# Patient Record
Sex: MALE | Race: WHITE | NOT HISPANIC OR LATINO
[De-identification: names, ages, dates, MRNs, and addresses within clinical notes are randomized per-mention and may not be internally consistent; named-entity substitution may affect disease eponyms.]

---

## 2017-03-05 ENCOUNTER — RX ONLY (RX ONLY)
Age: 57
End: 2017-03-05

## 2020-01-16 ENCOUNTER — ESTABLISHED COMPREHENSIVE EXAM (OUTPATIENT)
Dept: URBAN - METROPOLITAN AREA CLINIC 79 | Facility: CLINIC | Age: 60
End: 2020-01-16

## 2020-01-16 DIAGNOSIS — H40.013: ICD-10-CM

## 2020-01-16 DIAGNOSIS — H52.12: ICD-10-CM

## 2020-01-16 PROCEDURE — 92014 COMPRE OPH EXAM EST PT 1/>: CPT

## 2020-01-16 PROCEDURE — 92015 DETERMINE REFRACTIVE STATE: CPT

## 2020-01-16 PROCEDURE — 92083 EXTENDED VISUAL FIELD XM: CPT

## 2020-01-16 PROCEDURE — 92133 CPTRZD OPH DX IMG PST SGM ON: CPT

## 2020-01-16 ASSESSMENT — VISUAL ACUITY
OS_CC: J2
OS_SC: 20/150
OS_CC: 20/30
OD_CC: 20/30
OD_CC: J5
OD_SC: 20/400

## 2020-01-16 ASSESSMENT — TONOMETRY
OD_IOP_MMHG: 20
OS_IOP_MMHG: 19

## 2022-03-02 ENCOUNTER — ESTABLISHED COMPREHENSIVE EXAM (OUTPATIENT)
Dept: URBAN - METROPOLITAN AREA CLINIC 79 | Facility: CLINIC | Age: 62
End: 2022-03-02

## 2022-03-02 DIAGNOSIS — H25.13: ICD-10-CM

## 2022-03-02 DIAGNOSIS — H40.013: ICD-10-CM

## 2022-03-02 PROCEDURE — 92014 COMPRE OPH EXAM EST PT 1/>: CPT

## 2022-03-02 PROCEDURE — 92083 EXTENDED VISUAL FIELD XM: CPT

## 2022-03-02 ASSESSMENT — VISUAL ACUITY
OD_SC: 20/200
OS_CC: 20/30-2
OD_CC: 20/25+2
OS_PH: 20/25
OS_CC: 20/25
OS_SC: 20/70
OD_CC: 20/20

## 2022-03-02 ASSESSMENT — TONOMETRY
OS_IOP_MMHG: 20
OD_IOP_MMHG: 19

## 2022-12-08 ENCOUNTER — IOP CHECK (OUTPATIENT)
Dept: URBAN - METROPOLITAN AREA CLINIC 79 | Facility: CLINIC | Age: 62
End: 2022-12-08

## 2022-12-08 DIAGNOSIS — H25.13: ICD-10-CM

## 2022-12-08 DIAGNOSIS — H40.013: ICD-10-CM

## 2022-12-08 PROCEDURE — 92133 CPTRZD OPH DX IMG PST SGM ON: CPT

## 2022-12-08 PROCEDURE — 99213 OFFICE O/P EST LOW 20 MIN: CPT

## 2022-12-08 ASSESSMENT — VISUAL ACUITY
OS_CC: 20/20
OD_CC: 20/25-1

## 2022-12-08 ASSESSMENT — TONOMETRY
OD_IOP_MMHG: 18
OS_IOP_MMHG: 20

## 2023-04-13 ENCOUNTER — ESTABLISHED COMPREHENSIVE EXAM (OUTPATIENT)
Dept: URBAN - METROPOLITAN AREA CLINIC 79 | Facility: CLINIC | Age: 63
End: 2023-04-13

## 2023-04-13 DIAGNOSIS — H40.013: ICD-10-CM

## 2023-04-13 DIAGNOSIS — H52.12: ICD-10-CM

## 2023-04-13 DIAGNOSIS — H25.13: ICD-10-CM

## 2023-04-13 PROCEDURE — 92015 DETERMINE REFRACTIVE STATE: CPT

## 2023-04-13 PROCEDURE — 92250 FUNDUS PHOTOGRAPHY W/I&R: CPT

## 2023-04-13 PROCEDURE — 99213 OFFICE O/P EST LOW 20 MIN: CPT

## 2023-04-13 PROCEDURE — 92083 EXTENDED VISUAL FIELD XM: CPT

## 2023-04-13 ASSESSMENT — VISUAL ACUITY
OD_SC: 20/400
OS_CC: 20/25+3
OD_CC: 20/25+3
OS_CC: 20/20-1
OD_CC: 20/30-1
OS_SC: 20/70

## 2023-04-13 ASSESSMENT — TONOMETRY
OS_IOP_MMHG: 18
OD_IOP_MMHG: 16

## 2024-01-29 ENCOUNTER — TRANSCRIBE ORDERS (OUTPATIENT)
Dept: REGISTRATION | Facility: HOSPITAL | Age: 64
End: 2024-01-29

## 2024-01-29 ENCOUNTER — HOSPITAL ENCOUNTER (OUTPATIENT)
Dept: RADIOLOGY | Facility: HOSPITAL | Age: 64
Discharge: HOME | End: 2024-01-29
Attending: PHYSICIAN ASSISTANT
Payer: COMMERCIAL

## 2024-01-29 DIAGNOSIS — N50.89 OTHER SPECIFIED DISORDERS OF THE MALE GENITAL ORGANS: ICD-10-CM

## 2024-01-29 DIAGNOSIS — N50.89 OTHER SPECIFIED DISORDERS OF THE MALE GENITAL ORGANS: Primary | ICD-10-CM

## 2024-01-29 PROCEDURE — 76870 US EXAM SCROTUM: CPT

## 2024-03-05 ENCOUNTER — APPOINTMENT (OUTPATIENT)
Age: 64
Setting detail: DERMATOLOGY
End: 2024-03-05

## 2024-03-05 DIAGNOSIS — L72.8 OTHER FOLLICULAR CYSTS OF THE SKIN AND SUBCUTANEOUS TISSUE: ICD-10-CM

## 2024-03-05 PROCEDURE — 99212 OFFICE O/P EST SF 10 MIN: CPT

## 2024-03-05 PROCEDURE — OTHER COUNSELING: OTHER

## 2024-03-05 PROCEDURE — OTHER PRESCRIPTION: OTHER

## 2024-03-05 PROCEDURE — OTHER PRESCRIPTION MEDICATION MANAGEMENT: OTHER

## 2024-03-05 RX ORDER — MUPIROCIN 20 MG/G
OINTMENT TOPICAL
Qty: 22 | Refills: 0 | Status: ERX | COMMUNITY
Start: 2024-03-05

## 2024-03-05 ASSESSMENT — LOCATION ZONE DERM: LOCATION ZONE: FACE

## 2024-03-05 ASSESSMENT — LOCATION SIMPLE DESCRIPTION DERM: LOCATION SIMPLE: LEFT CHEEK

## 2024-03-05 ASSESSMENT — LOCATION DETAILED DESCRIPTION DERM: LOCATION DETAILED: LEFT INFERIOR LATERAL MALAR CHEEK

## 2024-03-05 NOTE — PROCEDURE: PRESCRIPTION MEDICATION MANAGEMENT
Continue Regimen: Continue doxycycline 100 mg bid x 10 days.
Detail Level: Zone
Render In Strict Bullet Format?: No

## 2024-03-25 ENCOUNTER — RX ONLY (RX ONLY)
Age: 64
End: 2024-03-25

## 2024-03-25 RX ORDER — KETOCONAZOLE 20 MG/G
CREAM TOPICAL
Qty: 60 | Refills: 1 | Status: ERX | COMMUNITY
Start: 2024-03-25

## 2024-04-10 ENCOUNTER — APPOINTMENT (OUTPATIENT)
Age: 64
Setting detail: DERMATOLOGY
End: 2024-04-11

## 2024-04-10 DIAGNOSIS — M72.2 PLANTAR FASCIAL FIBROMATOSIS: ICD-10-CM

## 2024-04-10 DIAGNOSIS — D22 MELANOCYTIC NEVI: ICD-10-CM

## 2024-04-10 DIAGNOSIS — L82.1 OTHER SEBORRHEIC KERATOSIS: ICD-10-CM

## 2024-04-10 DIAGNOSIS — L81.4 OTHER MELANIN HYPERPIGMENTATION: ICD-10-CM

## 2024-04-10 DIAGNOSIS — L663 OTHER SPECIFIED DISEASES OF HAIR AND HAIR FOLLICLES: ICD-10-CM

## 2024-04-10 DIAGNOSIS — D18.0 HEMANGIOMA: ICD-10-CM

## 2024-04-10 DIAGNOSIS — L57.8 OTHER SKIN CHANGES DUE TO CHRONIC EXPOSURE TO NONIONIZING RADIATION: ICD-10-CM

## 2024-04-10 DIAGNOSIS — L738 OTHER SPECIFIED DISEASES OF HAIR AND HAIR FOLLICLES: ICD-10-CM

## 2024-04-10 PROBLEM — L02.426 FURUNCLE OF LEFT LOWER LIMB: Status: ACTIVE | Noted: 2024-04-10

## 2024-04-10 PROBLEM — L02.425 FURUNCLE OF RIGHT LOWER LIMB: Status: ACTIVE | Noted: 2024-04-10

## 2024-04-10 PROBLEM — D22.61 MELANOCYTIC NEVI OF RIGHT UPPER LIMB, INCLUDING SHOULDER: Status: ACTIVE | Noted: 2024-04-10

## 2024-04-10 PROBLEM — D18.01 HEMANGIOMA OF SKIN AND SUBCUTANEOUS TISSUE: Status: ACTIVE | Noted: 2024-04-10

## 2024-04-10 PROCEDURE — OTHER SUNSCREEN RECOMMENDATIONS: OTHER

## 2024-04-10 PROCEDURE — OTHER COUNSELING: OTHER

## 2024-04-10 PROCEDURE — 99213 OFFICE O/P EST LOW 20 MIN: CPT

## 2024-04-10 ASSESSMENT — LOCATION DETAILED DESCRIPTION DERM
LOCATION DETAILED: LEFT MEDIAL PLANTAR MIDFOOT
LOCATION DETAILED: LEFT INFERIOR MEDIAL MIDBACK
LOCATION DETAILED: RIGHT MEDIAL UPPER BACK
LOCATION DETAILED: RIGHT PROXIMAL POSTERIOR UPPER ARM
LOCATION DETAILED: RIGHT DISTAL CALF
LOCATION DETAILED: RIGHT SUPERIOR UPPER BACK
LOCATION DETAILED: EPIGASTRIC SKIN
LOCATION DETAILED: LEFT DISTAL CALF

## 2024-04-10 ASSESSMENT — LOCATION SIMPLE DESCRIPTION DERM
LOCATION SIMPLE: LEFT CALF
LOCATION SIMPLE: ABDOMEN
LOCATION SIMPLE: LEFT LOWER BACK
LOCATION SIMPLE: LEFT PLANTAR SURFACE
LOCATION SIMPLE: RIGHT UPPER BACK
LOCATION SIMPLE: RIGHT UPPER ARM
LOCATION SIMPLE: RIGHT CALF

## 2024-04-10 ASSESSMENT — LOCATION ZONE DERM
LOCATION ZONE: LEG
LOCATION ZONE: TRUNK
LOCATION ZONE: FEET
LOCATION ZONE: ARM

## 2024-04-10 NOTE — PROCEDURE: SUNSCREEN RECOMMENDATIONS

## 2024-05-22 ENCOUNTER — ESTABLISHED COMPREHENSIVE EXAM (OUTPATIENT)
Dept: URBAN - METROPOLITAN AREA CLINIC 79 | Facility: CLINIC | Age: 64
End: 2024-05-22

## 2024-05-22 DIAGNOSIS — H43.813: ICD-10-CM

## 2024-05-22 DIAGNOSIS — H25.13: ICD-10-CM

## 2024-05-22 DIAGNOSIS — H04.123: ICD-10-CM

## 2024-05-22 DIAGNOSIS — H52.11: ICD-10-CM

## 2024-05-22 DIAGNOSIS — H40.013: ICD-10-CM

## 2024-05-22 PROCEDURE — 92083 EXTENDED VISUAL FIELD XM: CPT

## 2024-05-22 PROCEDURE — 92015 DETERMINE REFRACTIVE STATE: CPT

## 2024-05-22 PROCEDURE — 92133 CPTRZD OPH DX IMG PST SGM ON: CPT

## 2024-05-22 PROCEDURE — 92014 COMPRE OPH EXAM EST PT 1/>: CPT

## 2024-05-22 PROCEDURE — 83861 MICROFLUID ANALY TEARS: CPT

## 2024-05-22 ASSESSMENT — VISUAL ACUITY
OD_CC: J3
OS_CC: J2
OD_CC: 20/30+1
OS_CC: 20/25-3

## 2024-05-22 ASSESSMENT — TONOMETRY
OD_IOP_MMHG: 20
OS_IOP_MMHG: 21

## 2024-10-28 ENCOUNTER — APPOINTMENT (EMERGENCY)
Dept: RADIOLOGY | Facility: HOSPITAL | Age: 64
DRG: 280 | End: 2024-10-28
Payer: COMMERCIAL

## 2024-10-28 ENCOUNTER — HOSPITAL ENCOUNTER (INPATIENT)
Facility: HOSPITAL | Age: 64
LOS: 1 days | Discharge: HOME | DRG: 280 | End: 2024-10-30
Attending: EMERGENCY MEDICINE | Admitting: INTERNAL MEDICINE
Payer: COMMERCIAL

## 2024-10-28 DIAGNOSIS — I82.403 DEEP VEIN THROMBOSIS (DVT) OF BOTH LOWER EXTREMITIES, UNSPECIFIED CHRONICITY, UNSPECIFIED VEIN (CMS/HCC): ICD-10-CM

## 2024-10-28 DIAGNOSIS — I25.10 CAD (CORONARY ARTERY DISEASE): ICD-10-CM

## 2024-10-28 DIAGNOSIS — I21.4 NSTEMI (NON-ST ELEVATED MYOCARDIAL INFARCTION) (CMS/HCC): Primary | ICD-10-CM

## 2024-10-28 DIAGNOSIS — R79.89 ELEVATED TROPONIN: ICD-10-CM

## 2024-10-28 PROBLEM — F41.9 ANXIETY: Status: ACTIVE | Noted: 2024-10-28

## 2024-10-28 LAB
ALBUMIN SERPL-MCNC: 4.4 G/DL (ref 3.5–5.7)
ALP SERPL-CCNC: 53 IU/L (ref 34–125)
ALT SERPL-CCNC: 30 IU/L (ref 7–52)
ANION GAP SERPL CALC-SCNC: 9 MEQ/L (ref 3–15)
AST SERPL-CCNC: 23 IU/L (ref 13–39)
BASOPHILS # BLD: 0.04 K/UL (ref 0.01–0.1)
BASOPHILS NFR BLD: 0.5 %
BILIRUB SERPL-MCNC: 0.5 MG/DL (ref 0.3–1.2)
BUN SERPL-MCNC: 19 MG/DL (ref 7–25)
CALCIUM SERPL-MCNC: 9.7 MG/DL (ref 8.6–10.3)
CHLORIDE SERPL-SCNC: 103 MEQ/L (ref 98–107)
CO2 SERPL-SCNC: 24 MEQ/L (ref 21–31)
CREAT SERPL-MCNC: 1 MG/DL (ref 0.7–1.3)
DIFFERENTIAL METHOD BLD: NORMAL
EGFRCR SERPLBLD CKD-EPI 2021: >60 ML/MIN/1.73M*2
EOSINOPHIL # BLD: 0.07 K/UL (ref 0.04–0.54)
EOSINOPHIL NFR BLD: 0.9 %
ERYTHROCYTE [DISTWIDTH] IN BLOOD BY AUTOMATED COUNT: 12.8 % (ref 11.6–14.4)
GLUCOSE SERPL-MCNC: 111 MG/DL (ref 70–99)
HCT VFR BLD AUTO: 46.5 % (ref 40.1–51)
HGB BLD-MCNC: 15.9 G/DL (ref 13.7–17.5)
IMM GRANULOCYTES # BLD AUTO: 0.01 K/UL (ref 0–0.08)
IMM GRANULOCYTES NFR BLD AUTO: 0.1 %
LYMPHOCYTES # BLD: 2.23 K/UL (ref 1.2–3.5)
LYMPHOCYTES NFR BLD: 29.7 %
MCH RBC QN AUTO: 32.6 PG (ref 28–33.2)
MCHC RBC AUTO-ENTMCNC: 34.2 G/DL (ref 32.2–36.5)
MCV RBC AUTO: 95.5 FL (ref 83–98)
MONOCYTES # BLD: 0.91 K/UL (ref 0.3–1)
MONOCYTES NFR BLD: 12.1 %
NEUTROPHILS # BLD: 4.24 K/UL (ref 1.7–7)
NEUTS SEG NFR BLD: 56.7 %
NRBC BLD-RTO: 0 %
PLATELET # BLD AUTO: 157 K/UL (ref 150–350)
PMV BLD AUTO: 10.8 FL (ref 9.4–12.4)
POTASSIUM SERPL-SCNC: 3.8 MEQ/L (ref 3.5–5.1)
PROT SERPL-MCNC: 7.6 G/DL (ref 6–8.2)
RBC # BLD AUTO: 4.87 M/UL (ref 4.5–5.8)
SODIUM SERPL-SCNC: 136 MEQ/L (ref 136–145)
TROPONIN I SERPL HS-MCNC: 401.4 PG/ML
TROPONIN I SERPL HS-MCNC: 498.7 PG/ML
WBC # BLD AUTO: 7.5 K/UL (ref 3.8–10.5)

## 2024-10-28 PROCEDURE — 71046 X-RAY EXAM CHEST 2 VIEWS: CPT

## 2024-10-28 PROCEDURE — 84484 ASSAY OF TROPONIN QUANT: CPT | Performed by: EMERGENCY MEDICINE

## 2024-10-28 PROCEDURE — 63700000 HC SELF-ADMINISTRABLE DRUG: Performed by: STUDENT IN AN ORGANIZED HEALTH CARE EDUCATION/TRAINING PROGRAM

## 2024-10-28 PROCEDURE — 85025 COMPLETE CBC W/AUTO DIFF WBC: CPT | Performed by: EMERGENCY MEDICINE

## 2024-10-28 PROCEDURE — 93005 ELECTROCARDIOGRAM TRACING: CPT

## 2024-10-28 PROCEDURE — 80053 COMPREHEN METABOLIC PANEL: CPT

## 2024-10-28 PROCEDURE — 63700000 HC SELF-ADMINISTRABLE DRUG

## 2024-10-28 PROCEDURE — 99222 1ST HOSP IP/OBS MODERATE 55: CPT | Performed by: STUDENT IN AN ORGANIZED HEALTH CARE EDUCATION/TRAINING PROGRAM

## 2024-10-28 PROCEDURE — 36415 COLL VENOUS BLD VENIPUNCTURE: CPT

## 2024-10-28 PROCEDURE — 85025 COMPLETE CBC W/AUTO DIFF WBC: CPT

## 2024-10-28 PROCEDURE — G0378 HOSPITAL OBSERVATION PER HR: HCPCS

## 2024-10-28 PROCEDURE — 93005 ELECTROCARDIOGRAM TRACING: CPT | Performed by: EMERGENCY MEDICINE

## 2024-10-28 PROCEDURE — 84484 ASSAY OF TROPONIN QUANT: CPT | Mod: 91 | Performed by: EMERGENCY MEDICINE

## 2024-10-28 PROCEDURE — 80053 COMPREHEN METABOLIC PANEL: CPT | Performed by: EMERGENCY MEDICINE

## 2024-10-28 PROCEDURE — 99285 EMERGENCY DEPT VISIT HI MDM: CPT | Mod: 25

## 2024-10-28 RX ORDER — DULOXETIN HYDROCHLORIDE 20 MG/1
20 CAPSULE, DELAYED RELEASE ORAL DAILY
Status: DISCONTINUED | OUTPATIENT
Start: 2024-10-29 | End: 2024-10-28

## 2024-10-28 RX ORDER — DULOXETIN HYDROCHLORIDE 20 MG/1
40 CAPSULE, DELAYED RELEASE ORAL DAILY
Status: DISCONTINUED | OUTPATIENT
Start: 2024-10-29 | End: 2024-10-30 | Stop reason: HOSPADM

## 2024-10-28 RX ORDER — CLONAZEPAM 0.5 MG/1
0.5 TABLET ORAL ONCE
Status: COMPLETED | OUTPATIENT
Start: 2024-10-29 | End: 2024-10-28

## 2024-10-28 RX ORDER — POTASSIUM CHLORIDE 14.9 MG/ML
20 INJECTION INTRAVENOUS AS NEEDED
Status: DISCONTINUED | OUTPATIENT
Start: 2024-10-28 | End: 2024-10-30 | Stop reason: HOSPADM

## 2024-10-28 RX ORDER — AMLODIPINE BESYLATE 2.5 MG/1
2.5 TABLET ORAL DAILY
COMMUNITY
Start: 2023-12-06

## 2024-10-28 RX ORDER — NITROGLYCERIN 0.4 MG/1
0.4 TABLET SUBLINGUAL EVERY 5 MIN PRN
Status: DISCONTINUED | OUTPATIENT
Start: 2024-10-28 | End: 2024-10-30 | Stop reason: HOSPADM

## 2024-10-28 RX ORDER — CELECOXIB 100 MG/1
100-200 CAPSULE ORAL DAILY PRN
COMMUNITY

## 2024-10-28 RX ORDER — ATORVASTATIN CALCIUM 20 MG/1
20 TABLET, FILM COATED ORAL DAILY
COMMUNITY
Start: 2024-10-25

## 2024-10-28 RX ORDER — DULOXETIN HYDROCHLORIDE 20 MG/1
40 CAPSULE, DELAYED RELEASE ORAL DAILY
COMMUNITY
Start: 2024-10-20

## 2024-10-28 RX ORDER — NAPROXEN SODIUM 220 MG/1
324 TABLET, FILM COATED ORAL ONCE
Status: COMPLETED | OUTPATIENT
Start: 2024-10-28 | End: 2024-10-28

## 2024-10-28 RX ORDER — NAPROXEN SODIUM 220 MG/1
81 TABLET, FILM COATED ORAL DAILY
Status: DISCONTINUED | OUTPATIENT
Start: 2024-10-29 | End: 2024-10-30 | Stop reason: HOSPADM

## 2024-10-28 RX ORDER — ATORVASTATIN CALCIUM 20 MG/1
20 TABLET, FILM COATED ORAL DAILY
Status: DISCONTINUED | OUTPATIENT
Start: 2024-10-29 | End: 2024-10-30 | Stop reason: HOSPADM

## 2024-10-28 RX ORDER — DEXTROSE 40 %
15-30 GEL (GRAM) ORAL AS NEEDED
Status: DISCONTINUED | OUTPATIENT
Start: 2024-10-28 | End: 2024-10-30 | Stop reason: HOSPADM

## 2024-10-28 RX ORDER — ACETAMINOPHEN 325 MG/1
650 TABLET ORAL EVERY 4 HOURS PRN
Status: DISCONTINUED | OUTPATIENT
Start: 2024-10-28 | End: 2024-10-30 | Stop reason: HOSPADM

## 2024-10-28 RX ORDER — KETOCONAZOLE 20 MG/G
1 CREAM TOPICAL 2 TIMES DAILY PRN
COMMUNITY

## 2024-10-28 RX ORDER — LISINOPRIL 40 MG/1
40 TABLET ORAL DAILY
COMMUNITY
Start: 2023-12-06

## 2024-10-28 RX ORDER — DEXTROSE 50 % IN WATER (D50W) INTRAVENOUS SYRINGE
25 AS NEEDED
Status: DISCONTINUED | OUTPATIENT
Start: 2024-10-28 | End: 2024-10-30 | Stop reason: HOSPADM

## 2024-10-28 RX ORDER — IBUPROFEN 200 MG
16-32 TABLET ORAL AS NEEDED
Status: DISCONTINUED | OUTPATIENT
Start: 2024-10-28 | End: 2024-10-30 | Stop reason: HOSPADM

## 2024-10-28 RX ORDER — CLONAZEPAM 0.5 MG/1
0.5 TABLET ORAL ONCE
Status: COMPLETED | OUTPATIENT
Start: 2024-10-28 | End: 2024-10-28

## 2024-10-28 RX ORDER — LISINOPRIL 20 MG/1
40 TABLET ORAL DAILY
Status: DISCONTINUED | OUTPATIENT
Start: 2024-10-29 | End: 2024-10-30 | Stop reason: HOSPADM

## 2024-10-28 RX ORDER — AMLODIPINE BESYLATE 2.5 MG/1
2.5 TABLET ORAL DAILY
Status: DISCONTINUED | OUTPATIENT
Start: 2024-10-29 | End: 2024-10-30 | Stop reason: HOSPADM

## 2024-10-28 RX ADMIN — CLONAZEPAM 0.5 MG: 0.5 TABLET ORAL at 23:54

## 2024-10-28 RX ADMIN — CLONAZEPAM 0.5 MG: 0.5 TABLET ORAL at 22:38

## 2024-10-28 RX ADMIN — ASPIRIN 81 MG 324 MG: 81 TABLET ORAL at 21:03

## 2024-10-28 ASSESSMENT — COGNITIVE AND FUNCTIONAL STATUS - GENERAL
WALKING IN HOSPITAL ROOM: 4 - NONE
CLIMB 3 TO 5 STEPS WITH RAILING: 4 - NONE
STANDING UP FROM CHAIR USING ARMS: 4 - NONE
MOVING TO AND FROM BED TO CHAIR: 4 - NONE

## 2024-10-28 ASSESSMENT — ENCOUNTER SYMPTOMS: SHORTNESS OF BREATH: 1

## 2024-10-28 NOTE — Clinical Note
The bilateral groins and left radial was clipped, marked and prepped with ChloraPrep. The patient was draped in a sterile fashion after allowing for the recommended dry time.

## 2024-10-28 NOTE — Clinical Note
Patient placed on procedure table in supine position with left arm extended. Positioning devices: arm board under arms and safety strap applied.

## 2024-10-29 ENCOUNTER — APPOINTMENT (OUTPATIENT)
Dept: RADIOLOGY | Facility: HOSPITAL | Age: 64
Setting detail: OBSERVATION
DRG: 280 | End: 2024-10-29
Attending: INTERNAL MEDICINE
Payer: COMMERCIAL

## 2024-10-29 ENCOUNTER — APPOINTMENT (OUTPATIENT)
Dept: CARDIOLOGY | Facility: HOSPITAL | Age: 64
Setting detail: OBSERVATION
DRG: 280 | End: 2024-10-29
Attending: NURSE PRACTITIONER
Payer: COMMERCIAL

## 2024-10-29 PROBLEM — I26.99 PULMONARY EMBOLISM (CMS/HCC): Status: ACTIVE | Noted: 2024-10-29

## 2024-10-29 PROBLEM — R55 SYNCOPE: Status: ACTIVE | Noted: 2024-10-29

## 2024-10-29 PROBLEM — I21.4 NSTEMI (NON-ST ELEVATED MYOCARDIAL INFARCTION) (CMS/HCC): Status: ACTIVE | Noted: 2024-10-28

## 2024-10-29 LAB
ANION GAP SERPL CALC-SCNC: 10 MEQ/L (ref 3–15)
AORTIC ROOT ANNULUS: 3.7 CM
ASCENDING AORTA: 3.9 CM
ATRIAL RATE: 86
ATRIAL RATE: 89
BSA FOR ECHO PROCEDURE: 2.54 M2
BUN SERPL-MCNC: 16 MG/DL (ref 7–25)
CALCIUM SERPL-MCNC: 9.4 MG/DL (ref 8.6–10.3)
CATH EF ESTIMATED: 65 %
CHLORIDE SERPL-SCNC: 104 MEQ/L (ref 98–107)
CHOLEST SERPL-MCNC: 166 MG/DL
CO2 SERPL-SCNC: 24 MEQ/L (ref 21–31)
CREAT SERPL-MCNC: 1.1 MG/DL (ref 0.7–1.3)
D DIMER PPP IA.FEU-MCNC: >20 UG/ML FEU (ref 0–0.5)
E WAVE DECELERATION TIME: 433 MS
E/A RATIO: 0.7
E/E' RATIO: 8
E/LAT E' RATIO: 4.3
EGFRCR SERPLBLD CKD-EPI 2021: >60 ML/MIN/1.73M*2
ERYTHROCYTE [DISTWIDTH] IN BLOOD BY AUTOMATED COUNT: 12.8 % (ref 11.6–14.4)
EST RIGHT VENT SYSTOLIC PRESSURE BY TRICUSPID REGURGITATION JET: 22 MMHG
FLUAV RNA SPEC QL NAA+PROBE: NEGATIVE
FLUBV RNA SPEC QL NAA+PROBE: NEGATIVE
GLUCOSE SERPL-MCNC: 114 MG/DL (ref 70–99)
HCT VFR BLD AUTO: 46.4 % (ref 40.1–51)
HDLC SERPL-MCNC: 43 MG/DL
HDLC SERPL: 3.9 {RATIO}
HEART RATE: 76 BPM
HGB BLD-MCNC: 15.5 G/DL (ref 13.7–17.5)
LDLC SERPL CALC-MCNC: 104 MG/DL
LVOT 2D: 2 CM
LVOT A: 3.14 CM2
MAGNESIUM SERPL-MCNC: 2.1 MG/DL (ref 1.8–2.5)
MCH RBC QN AUTO: 31.7 PG (ref 28–33.2)
MCHC RBC AUTO-ENTMCNC: 33.4 G/DL (ref 32.2–36.5)
MCV RBC AUTO: 94.9 FL (ref 83–98)
MV E'TISSUE VEL-LAT: 0.1 M/S
MV E'TISSUE VEL-MED: 0.05 M/S
MV PEAK A VEL: 0.64 M/S
MV PEAK E VEL: 0.42 M/S
MV STENOSIS PRESSURE HALF TIME: 127 MS
MV VALVE AREA P 1/2 METHOD: 1.73 CM2
NONHDLC SERPL-MCNC: 123 MG/DL
P AXIS: 27
P AXIS: 31
PLATELET # BLD AUTO: 156 K/UL (ref 150–350)
PMV BLD AUTO: 10.9 FL (ref 9.4–12.4)
POTASSIUM SERPL-SCNC: 4.2 MEQ/L (ref 3.5–5.1)
PR INTERVAL: 160
PR INTERVAL: 162
QRS DURATION: 86
QRS DURATION: 90
QT INTERVAL: 368
QT INTERVAL: 378
QTC CALCULATION(BAZETT): 447
QTC CALCULATION(BAZETT): 452
R AXIS: 52
R AXIS: 61
RAP: 3 MMHG
RBC # BLD AUTO: 4.89 M/UL (ref 4.5–5.8)
RSV RNA SPEC QL NAA+PROBE: NEGATIVE
SARS-COV-2 RNA RESP QL NAA+PROBE: NEGATIVE
SARS-COV-2 RNA RESP QL NAA+PROBE: NEGATIVE
SEPTAL TISSUE DOPPLER FREE WALL LATE DIA VELOCITY (APICAL 4 CHAMBER VIEW): 0.11 M/S
SODIUM SERPL-SCNC: 138 MEQ/L (ref 136–145)
T WAVE AXIS: 58
T WAVE AXIS: 60
TAPSE: 1.93 CM
TR MAX PG: 19.18 MMHG
TRICUSPID VALVE PEAK REGURGITATION VELOCITY: 2.19 M/S
TRIGL SERPL-MCNC: 95 MG/DL
TROPONIN I SERPL HS-MCNC: 214.6 PG/ML
VENTRICULAR RATE: 86
VENTRICULAR RATE: 89
WBC # BLD AUTO: 6.62 K/UL (ref 3.8–10.5)

## 2024-10-29 PROCEDURE — 25000000 HC PHARMACY GENERAL: Performed by: INTERNAL MEDICINE

## 2024-10-29 PROCEDURE — 93010 ELECTROCARDIOGRAM REPORT: CPT | Mod: 76 | Performed by: STUDENT IN AN ORGANIZED HEALTH CARE EDUCATION/TRAINING PROGRAM

## 2024-10-29 PROCEDURE — 84484 ASSAY OF TROPONIN QUANT: CPT | Performed by: NURSE PRACTITIONER

## 2024-10-29 PROCEDURE — 80061 LIPID PANEL: CPT | Performed by: STUDENT IN AN ORGANIZED HEALTH CARE EDUCATION/TRAINING PROGRAM

## 2024-10-29 PROCEDURE — G0378 HOSPITAL OBSERVATION PER HR: HCPCS

## 2024-10-29 PROCEDURE — 4A023N7 MEASUREMENT OF CARDIAC SAMPLING AND PRESSURE, LEFT HEART, PERCUTANEOUS APPROACH: ICD-10-PCS | Performed by: INTERNAL MEDICINE

## 2024-10-29 PROCEDURE — 85027 COMPLETE CBC AUTOMATED: CPT | Performed by: STUDENT IN AN ORGANIZED HEALTH CARE EDUCATION/TRAINING PROGRAM

## 2024-10-29 PROCEDURE — A9567 TECHNETIUM TC-99M AEROSOL: HCPCS | Performed by: INTERNAL MEDICINE

## 2024-10-29 PROCEDURE — B2111ZZ FLUOROSCOPY OF MULTIPLE CORONARY ARTERIES USING LOW OSMOLAR CONTRAST: ICD-10-PCS | Performed by: INTERNAL MEDICINE

## 2024-10-29 PROCEDURE — 63600000 HC DRUGS/DETAIL CODE: Mod: JZ,TB | Performed by: INTERNAL MEDICINE

## 2024-10-29 PROCEDURE — 71000011 HC PACU PHASE 1 EA ADDL MIN: Performed by: INTERNAL MEDICINE

## 2024-10-29 PROCEDURE — A9540 TC99M MAA: HCPCS | Performed by: INTERNAL MEDICINE

## 2024-10-29 PROCEDURE — 93571 IV DOP VEL&/PRESS C FLO 1ST: CPT | Mod: 52,LD | Performed by: INTERNAL MEDICINE

## 2024-10-29 PROCEDURE — 83735 ASSAY OF MAGNESIUM: CPT | Performed by: STUDENT IN AN ORGANIZED HEALTH CARE EDUCATION/TRAINING PROGRAM

## 2024-10-29 PROCEDURE — 78582 LUNG VENTILAT&PERFUS IMAGING: CPT

## 2024-10-29 PROCEDURE — 93306 TTE W/DOPPLER COMPLETE: CPT | Mod: 26 | Performed by: INTERNAL MEDICINE

## 2024-10-29 PROCEDURE — C8929 TTE W OR WO FOL WCON,DOPPLER: HCPCS

## 2024-10-29 PROCEDURE — 63700000 HC SELF-ADMINISTRABLE DRUG: Performed by: HOSPITALIST

## 2024-10-29 PROCEDURE — 63600000 HC DRUGS/DETAIL CODE: Mod: JW | Performed by: INTERNAL MEDICINE

## 2024-10-29 PROCEDURE — C1887 CATHETER, GUIDING: HCPCS | Performed by: INTERNAL MEDICINE

## 2024-10-29 PROCEDURE — C1769 GUIDE WIRE: HCPCS | Performed by: INTERNAL MEDICINE

## 2024-10-29 PROCEDURE — 63700000 HC SELF-ADMINISTRABLE DRUG: Performed by: STUDENT IN AN ORGANIZED HEALTH CARE EDUCATION/TRAINING PROGRAM

## 2024-10-29 PROCEDURE — 80048 BASIC METABOLIC PNL TOTAL CA: CPT | Performed by: STUDENT IN AN ORGANIZED HEALTH CARE EDUCATION/TRAINING PROGRAM

## 2024-10-29 PROCEDURE — 87635 SARS-COV-2 COVID-19 AMP PRB: CPT | Performed by: INTERNAL MEDICINE

## 2024-10-29 PROCEDURE — C1894 INTRO/SHEATH, NON-LASER: HCPCS | Performed by: INTERNAL MEDICINE

## 2024-10-29 PROCEDURE — 99232 SBSQ HOSP IP/OBS MODERATE 35: CPT | Performed by: INTERNAL MEDICINE

## 2024-10-29 PROCEDURE — 99223 1ST HOSP IP/OBS HIGH 75: CPT | Performed by: STUDENT IN AN ORGANIZED HEALTH CARE EDUCATION/TRAINING PROGRAM

## 2024-10-29 PROCEDURE — 36415 COLL VENOUS BLD VENIPUNCTURE: CPT | Performed by: STUDENT IN AN ORGANIZED HEALTH CARE EDUCATION/TRAINING PROGRAM

## 2024-10-29 PROCEDURE — 93005 ELECTROCARDIOGRAM TRACING: CPT | Performed by: STUDENT IN AN ORGANIZED HEALTH CARE EDUCATION/TRAINING PROGRAM

## 2024-10-29 PROCEDURE — 87637 SARSCOV2&INF A&B&RSV AMP PRB: CPT | Performed by: STUDENT IN AN ORGANIZED HEALTH CARE EDUCATION/TRAINING PROGRAM

## 2024-10-29 PROCEDURE — 85379 FIBRIN DEGRADATION QUANT: CPT | Performed by: INTERNAL MEDICINE

## 2024-10-29 PROCEDURE — 25500000 HC DRUGS/INCIDENT RAD: Mod: JZ | Performed by: INTERNAL MEDICINE

## 2024-10-29 PROCEDURE — 93458 L HRT ARTERY/VENTRICLE ANGIO: CPT | Performed by: INTERNAL MEDICINE

## 2024-10-29 PROCEDURE — 93970 EXTREMITY STUDY: CPT

## 2024-10-29 PROCEDURE — 20600000 HC ROOM AND CARE INTERMEDIATE/TELEMETRY

## 2024-10-29 PROCEDURE — 71000001 HC PACU PHASE 1 INITIAL 30MIN: Performed by: INTERNAL MEDICINE

## 2024-10-29 PROCEDURE — 63600105 HC IODINE BASED CONTRAST: Performed by: INTERNAL MEDICINE

## 2024-10-29 PROCEDURE — 27200000 HC STERILE SUPPLY: Performed by: INTERNAL MEDICINE

## 2024-10-29 RX ORDER — HEPARIN SODIUM 1000 [USP'U]/ML
INJECTION, SOLUTION INTRAVENOUS; SUBCUTANEOUS
Status: DISCONTINUED | OUTPATIENT
Start: 2024-10-29 | End: 2024-10-29 | Stop reason: HOSPADM

## 2024-10-29 RX ORDER — IOPAMIDOL 755 MG/ML
INJECTION, SOLUTION INTRAVASCULAR
Status: DISCONTINUED | OUTPATIENT
Start: 2024-10-29 | End: 2024-10-29 | Stop reason: HOSPADM

## 2024-10-29 RX ORDER — NICARDIPINE HCL-0.9% SOD CHLOR 1 MG/10 ML
SYRINGE (ML) INTRAVENOUS
Status: DISCONTINUED | OUTPATIENT
Start: 2024-10-29 | End: 2024-10-29 | Stop reason: HOSPADM

## 2024-10-29 RX ORDER — CLONAZEPAM 0.5 MG/1
0.5 TABLET ORAL ONCE
Status: COMPLETED | OUTPATIENT
Start: 2024-10-29 | End: 2024-10-29

## 2024-10-29 RX ORDER — LIDOCAINE HYDROCHLORIDE 10 MG/ML
INJECTION, SOLUTION INFILTRATION; PERINEURAL
Status: DISCONTINUED | OUTPATIENT
Start: 2024-10-29 | End: 2024-10-29 | Stop reason: HOSPADM

## 2024-10-29 RX ORDER — ENOXAPARIN SODIUM 150 MG/ML
1 INJECTION SUBCUTANEOUS
Status: DISCONTINUED | OUTPATIENT
Start: 2024-10-29 | End: 2024-10-30 | Stop reason: HOSPADM

## 2024-10-29 RX ORDER — MIDAZOLAM HYDROCHLORIDE 2 MG/2ML
INJECTION, SOLUTION INTRAMUSCULAR; INTRAVENOUS
Status: DISCONTINUED | OUTPATIENT
Start: 2024-10-29 | End: 2024-10-29 | Stop reason: HOSPADM

## 2024-10-29 RX ORDER — FENTANYL CITRATE 50 UG/ML
INJECTION, SOLUTION INTRAMUSCULAR; INTRAVENOUS
Status: DISCONTINUED | OUTPATIENT
Start: 2024-10-29 | End: 2024-10-29 | Stop reason: HOSPADM

## 2024-10-29 RX ORDER — ENOXAPARIN SODIUM 150 MG/ML
1 INJECTION SUBCUTANEOUS
Status: DISCONTINUED | OUTPATIENT
Start: 2024-10-29 | End: 2024-10-29

## 2024-10-29 RX ADMIN — DULOXETINE HYDROCHLORIDE 40 MG: 20 CAPSULE, DELAYED RELEASE ORAL at 08:36

## 2024-10-29 RX ADMIN — ASPIRIN 81 MG 81 MG: 81 TABLET ORAL at 08:37

## 2024-10-29 RX ADMIN — AMLODIPINE BESYLATE 2.5 MG: 2.5 TABLET ORAL at 08:37

## 2024-10-29 RX ADMIN — LISINOPRIL 40 MG: 20 TABLET ORAL at 08:37

## 2024-10-29 RX ADMIN — ENOXAPARIN SODIUM 135 MG: 150 INJECTION SUBCUTANEOUS at 17:26

## 2024-10-29 RX ADMIN — KIT FOR THE PREPARATION OF TECHNETIUM TC 99M ALBUMIN AGGREGATED 4.4 MILLICURIE: 2.5 INJECTION, POWDER, FOR SOLUTION INTRAVENOUS at 15:20

## 2024-10-29 RX ADMIN — ATORVASTATIN CALCIUM 20 MG: 20 TABLET, FILM COATED ORAL at 08:37

## 2024-10-29 RX ADMIN — SODIUM CHLORIDE: 9 INJECTION INTRAMUSCULAR; INTRAVENOUS; SUBCUTANEOUS at 12:12

## 2024-10-29 RX ADMIN — KIT FOR THE PREPARATION OF TECHNETIUM TC 99M PENTETATE 38.4 MILLICURIE: 20 INJECTION, POWDER, LYOPHILIZED, FOR SOLUTION INTRAVENOUS; RESPIRATORY (INHALATION) at 14:50

## 2024-10-29 RX ADMIN — CLONAZEPAM 0.5 MG: 0.5 TABLET ORAL at 20:28

## 2024-10-29 ASSESSMENT — ENCOUNTER SYMPTOMS
LIGHT-HEADEDNESS: 1
NERVOUS/ANXIOUS: 0
ORTHOPNEA: 0
DYSURIA: 0
WHEEZING: 0
COLOR CHANGE: 0
NAIL CHANGES: 0
VOMITING: 0
INSOMNIA: 0
HOARSE VOICE: 0
SHORTNESS OF BREATH: 1
LIGHT-HEADEDNESS: 0
IRREGULAR HEARTBEAT: 0
COUGH: 0
HEMATOCHEZIA: 0
BLURRED VISION: 0
DIZZINESS: 0
CHILLS: 0
SPUTUM PRODUCTION: 0
HEMOPTYSIS: 0
ABDOMINAL PAIN: 0
ALTERED MENTAL STATUS: 0
NAUSEA: 0
HEMATEMESIS: 0
DIAPHORESIS: 0
HEMATURIA: 0
NEAR-SYNCOPE: 0
SYNCOPE: 1
WEAKNESS: 0
PND: 0
JAUNDICE: 0
PALPITATIONS: 0
FEVER: 0
FATIGUE: 1
MYALGIAS: 0
VERTIGO: 0
DYSPNEA ON EXERTION: 1

## 2024-10-29 ASSESSMENT — COGNITIVE AND FUNCTIONAL STATUS - GENERAL
STANDING UP FROM CHAIR USING ARMS: 4 - NONE
CLIMB 3 TO 5 STEPS WITH RAILING: 4 - NONE
MOVING TO AND FROM BED TO CHAIR: 4 - NONE
CLIMB 3 TO 5 STEPS WITH RAILING: 4 - NONE
WALKING IN HOSPITAL ROOM: 4 - NONE
STANDING UP FROM CHAIR USING ARMS: 4 - NONE
WALKING IN HOSPITAL ROOM: 4 - NONE
MOVING TO AND FROM BED TO CHAIR: 4 - NONE

## 2024-10-29 NOTE — DISCHARGE INSTRUCTIONS
Precautions/instructions following cardiac cath via radial artery/wrist:  -NO DRIVING FOR 24 HOURS after your cath procedure  --Limit use of affected arm for 24 hrs  --No lifting anything greater than 5lbs for 3 days with affected wrist  --Remove dressing the following day and wash with soap and water in the shower and pat dry.   - Keep site clean and dry  --Avoid submerging wrist in sitting water (tub, hot tub, pool, dish-washing, ocean) for 7 days.   - You may shower  --Avoid activities where the wrist may get heavily soiled (such as gardening, housecleaning etc) for 7 days.  --Check the site daily, call physician if you notice swelling, redness, drainage, increased discomfort, new numbness or fever greater than 101F  --IF BLEEDING OCCURS:  ---sit and apply firm pressure to site with your fingers for 10 minutes. If bleeding stops, continue to sit quietly keeping your wrist straight for 2 hrs and notify your physician as soon as possible.  ---If bleeding does not stop after 10 minutes or if there is a large amount of bleeding or spurting call 911 IMMEDIATELY, DO NOT DRIVE YOURSELF TO THE HOSPITAL

## 2024-10-29 NOTE — CONSULTS
Cardiology Consult Note      Reason for consultation: chest pain, elevated troponin    Subjective     Zaire Schwartz is a 64 y.o. male with a past medical history of PVC's, hypertension, hyperlipidemia, GUAMAN, neuroendocrine tumor status post excision, and pre-diabetes.       Mr Schwartz presents to [facility] with complaints of chest pain, shortness of breath and syncope. He notes that Sunday evening after returning from walking his dogs he developed sudden onset of shortness of breath. He attributed his symptoms to being deconditioned as he hadn't walked for a week due to being away.  He then developed lightheadedness followed by syncope.  He recalls waking up on the floor and calling to his family for help. At this time, he reports shortness of breath which he describes as panting. His symptoms lasted about 5-10 minutes and subsided. His wife assisted him back to bed and checked his blood pressure which was 109/70.  Shortly after, he reports chest discomfort which he describes as a dull ache in the center of his chest.  Yesterday, he continued with intermittent episodes of chest discomfort which he continued to describe as a dull ache. He denies the pain radiating to his neck, jaw or arm. However, he does report left arm discomfort last night while in the hospital.  He also denies associated diaphoresis, nausea, vomiting or numbness/tingling.     On admission, high sensitivity troponin elevated at 498 to 401 to 214. EKG negative for acute ischemic changes. He denies chest discomfort since admission.  D-dimer is > 20.     Medical History:   Past Medical History:   Diagnosis Date    Hypertension     Lipid disorder        Surgical History:   Past Surgical History   Procedure Laterality Date    Fracture surgery      Hernia repair         Social History:   Social History     Social History Narrative    Not on file      Social History     Tobacco Use   Smoking Status Never   Smokeless Tobacco Never       Family  History: No family history on file.      Allergies: No Known Allergies      Current Facility-Administered Medications   Medication Dose Route Frequency Provider Last Rate Last Admin    [Transfer Hold] acetaminophen (TYLENOL) tablet 650 mg  650 mg oral q4h PRN Nilson Leong MD        [Transfer Hold] amLODIPine (NORVASC) tablet 2.5 mg  2.5 mg oral Daily Nilson Leong MD   2.5 mg at 10/29/24 0837    [Transfer Hold] aspirin chewable tablet 81 mg  81 mg oral Daily Nilson Leong MD   81 mg at 10/29/24 0837    [Transfer Hold] atorvastatin (LIPITOR) tablet 20 mg  20 mg oral Daily Nilson Leong MD   20 mg at 10/29/24 0837    [Transfer Hold] glucose chewable tablet 16-32 g of dextrose  16-32 g of dextrose oral PRN Nilson Leong MD        Or    [Transfer Hold] dextrose 40 % oral gel 15-30 g of dextrose  15-30 g of dextrose oral PRN Nilson Leong MD        Or    [Transfer Hold] glucagon (GLUCAGEN) injection 1 mg  1 mg intramuscular PRN Nilson Leong MD        Or    [Transfer Hold] dextrose 50 % in water (D50) injection 12.5 g  25 mL intravenous PRN Nilson Leong MD        [Transfer Hold] DULoxetine (CYMBALTA) capsule,delayed release(DR/EC) 40 mg  40 mg oral Daily Nilson Leong MD   40 mg at 10/29/24 0836    fentaNYL (PF) (SUBLIMAZE) injection    Once via [facility] medication administration Dar Huynh MD   25 mcg at 10/29/24 1108    heparin (porcine) injection    Once via One Step medication administration Dar Huynh MD   12,000 Units at 10/29/24 1118    lidocaine (XYLOCAINE) 10 mg/mL (1 %) injection    Once via One Step medication administration Dar Huynh MD   5 mL at 10/29/24 1107    [Transfer Hold] lisinopriL (PRINIVIL) tablet 40 mg  40 mg oral Daily Nilson Leong MD   40 mg at 10/29/24 0837    midazolam (VERSED) 1 mg/mL injection    Once via One Step medication administration Dar Huynh MD   1 mg at 10/29/24 1121    niCARdipine in 0.9 % sod chlor (CARDENE)  injection (CATH ONLY)    Once via One Step medication administration Dar Huynh MD   150 mcg at 10/29/24 1108    [Transfer Hold] nitroglycerin (NITROSTAT) SL tablet 0.4 mg  0.4 mg sublingual q5 min PRN Nilson Leong MD        nitroglycerin 50 mcg/mL injection    Once via One Step medication administration Dar Huynh MD   150 mcg at 10/29/24 1108    [Transfer Hold] potassium chloride 20 mEq in 100 mL IVPB  (premix)  20 mEq intravenous PRN iNlson Leong MD        [Transfer Hold] potassium chloride 20 mEq in 100 mL IVPB  (premix)  20 mEq intravenous PRN Nilson Leong MD        And    [Transfer Hold] potassium chloride 20 mEq in 100 mL IVPB  (premix)  20 mEq intravenous PRN Nilson Leong MD             Review of Systems   Constitutional: Negative for diaphoresis, fever and malaise/fatigue.   HENT:  Negative for hoarse voice and nosebleeds.    Eyes:  Negative for blurred vision and visual disturbance.   Cardiovascular:  Positive for chest pain, dyspnea on exertion and syncope. Negative for irregular heartbeat, leg swelling, near-syncope, orthopnea, palpitations and paroxysmal nocturnal dyspnea.   Respiratory:  Positive for shortness of breath. Negative for hemoptysis, sputum production and wheezing.    Endocrine: Negative for cold intolerance and heat intolerance.   Hematologic/Lymphatic: Negative for bleeding problem.   Skin:  Negative for color change and nail changes.   Musculoskeletal:  Negative for muscle weakness and myalgias.   Gastrointestinal:  Negative for abdominal pain, hematemesis, hematochezia and jaundice.   Genitourinary:  Negative for dysuria and hematuria.   Neurological:  Positive for light-headedness. Negative for vertigo and weakness.   Psychiatric/Behavioral:  Negative for altered mental status. The patient does not have insomnia and is not nervous/anxious.          Objective       Visit Vitals  /61   Pulse 75   Temp 36.9 °C (98.5 °F) (Temporal)   Resp 18   Ht  "1.727 m (5' 8\")   Wt 135 kg (297 lb)   SpO2 95%   BMI 45.16 kg/m²       Physical Exam  Constitutional:       General: He is not in acute distress.     Appearance: He is well-developed. He is not diaphoretic.   HENT:      Head: Normocephalic.   Cardiovascular:      Rate and Rhythm: Normal rate and regular rhythm.      Heart sounds: Normal heart sounds.   Pulmonary:      Effort: No respiratory distress.      Breath sounds: Normal breath sounds. No wheezing.   Chest:      Chest wall: No tenderness.   Abdominal:      General: There is no distension.      Palpations: Abdomen is soft.      Tenderness: There is no abdominal tenderness.   Musculoskeletal:         General: Normal range of motion.      Cervical back: Normal range of motion.      Right lower leg: No edema.      Left lower leg: No edema.   Skin:     General: Skin is warm and dry.   Neurological:      Mental Status: He is alert and oriented to person, place, and time.          Labs   Lab Results   Component Value Date    WBC 6.62 10/29/2024    HGB 15.5 10/29/2024    HCT 46.4 10/29/2024     10/29/2024    CHOL 166 10/29/2024    TRIG 95 10/29/2024    HDL 43 10/29/2024    LDLCALC 104 (H) 10/29/2024    ALT 30 10/28/2024    AST 23 10/28/2024     10/29/2024    K 4.2 10/29/2024     10/29/2024    CREATININE 1.1 10/29/2024    BUN 16 10/29/2024    CO2 24 10/29/2024    GLUCOSE 114 (H) 10/29/2024       Imaging    Chest x-ray 10/28/2024:  IMPRESSION:  No radiographic evidence of acute cardiopulmonary disease.      ECG 10/28/2024      ECG 10/29/2024:      Telemetry  NSR    Assessment & Plan    Syncope  Assessment & Plan  -- Telemetry today unremarkable, prefer patient to be monitored on telemetry floor  -- Echocardiogram completed, results pending physician interpretation  -- Patient for cardiac catheterization today due to elevated troponin (498 to 401 to 214)  -- As noted above, d-dimer > 20 but due to normal RV and IVC on preliminary echo findings plan " was to first proceed with cardiac catheterization  -- Recommend outpatient event monitor if inpatient workup unremarkable    NSTEMI (non-ST elevated myocardial infarction) (CMS/[facility])  Assessment & Plan  -- Patient presents with shortness of breath, syncope and chest pain  -- HS troponin elevated but trending down 498 to 401 to 214. EKG negative for ischemic changes  -- D-dimer >20. Bedside echocardiogram completed and preliminary report per Dr Javier reveals normal sized RV and IVC. Thus, Dr Javier recommends holding off on CT chest and proceeding with cardiac catheterization for ischemic evaluation. The procedure was explained to patient and daughter (at bedside) who are in agreement. Patient denies allergy to shellfish or IV dye  -- Further plan of care pending cardiac catheterization results    Lipid disorder  Assessment & Plan  -- Continue Atorvastatin    Hypertension  Assessment & Plan  -- BP normotensive on current therapy, continue to monitr             This patient note has been dictated using speech recognition software. Inadvertent speech recognition errors should be disregarded. Please do not hesitate to call my office for clarifications.    FRANDY Marie  10/29/2024  11:29 AM

## 2024-10-29 NOTE — ASSESSMENT & PLAN NOTE
-- Patient presents with shortness of breath, syncope and chest pain  -- HS troponin elevated but trending down 498 to 401 to 214. EKG negative for ischemic changes  -- D-dimer >20. Bedside echocardiogram completed and preliminary report per Dr Javier reveals normal sized RV and IVC. Thus, Dr Javier recommends holding off on CT chest and proceeding with cardiac catheterization for ischemic evaluation. The procedure was explained to patient and daughter (at bedside) who are in agreement. Patient denies allergy to shellfish or IV dye  -- Further plan of care pending cardiac catheterization results

## 2024-10-29 NOTE — ASSESSMENT & PLAN NOTE
-VQ scan completed today shows high probability of PE.  Also ultrasound lower extremity positive for DVT.  -Ordered Lovenox 1 mg/kg SQ twice daily.  -Updated patient and family regarding the results  -2D echo done which did not show any evidence of right ventricular dysfunction.  EF 70 to 75%.  -Patient underwent cardiac catheterization this morning.  Thus VQ scan was completed to avoid further IV contrast exposure.     -Patient was informed regarding risk and benefits of anticoagulation.  Risk of anticoagulation including high risk of GI, , CNS bleeding and increased risk of bleeding in the event of trauma or injury were explained to the patient and family.  They were also informed regarding indication for anticoagulation which is for treatment for acute DVT and PE which can be life-threatening if not treated.  Patient and family would like to go ahead with anticoagulation.    -Denies any history of significant bleeding in the past.  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  His last EGD last year was negative for any significant findings as per the patient.    -Patient reports he recently traveled cross[place]country in the car for 1 month.  He was traveling from August to 30 September.  Patient is morbidly obese which could be a risk factor for DVT/PE.  Patient's wife is currently positive for COVID-19.  I will order rechecking for COVID.

## 2024-10-29 NOTE — ASSESSMENT & PLAN NOTE
-Underwent cardiac catheterization which showed 50% LAD lesion.  Medical treatment recommended by cardiology.

## 2024-10-29 NOTE — POST-PROCEDURE NOTE
Pt trasnported back to [facility] via stretcher accompanied by RN. L radial site CDI. No s/s of bleeding/hematoma.

## 2024-10-29 NOTE — PROGRESS NOTES
Brief Cardiovascular Post Procedure Note:    64 y.o. male s/p left heart cath with Dr. Huynh    Access site: Left radial artery    Results: 50% LAD lesion, IFR 0.96.    IV contrast administered: 80ml    Plan:   -Bedrest x 2 hr(s)  -Remove air from radial band in 2 hr(s)  -resume diet      FRANDY Flores  10/29/2024 11:31 AM

## 2024-10-29 NOTE — POST-PROCEDURE NOTE
Pt in recovery. Tolerated procedure well. VSS. Denies pain. L radial band intact. No s/s of bleeding/hematoma.

## 2024-10-29 NOTE — H&P
Hospital Medicine Service -  History & Physical        CHIEF COMPLAINT   Chief Complaint   Patient presents with    Chest Pain      HISTORY OF PRESENT ILLNESS      Zaire Schwartz is a 64 y.o. male with a past medical history of HTN, HLD, gastric carcinoid tumor, morbid obesity who presents with chest pain, admitted for NSTEMI    Patient presents with chest pain. Patient states around 11 PM last night, he had an episode of shortness of breath, lightheadedness, followed by loss of consciousness. He woke up on the ground and called for help, at which time his son came over to help. Total down time thought to be less than a few minutes, and by the time family came he was already getting up. Since then he started having chest pain in the middle of his chest, without radiation, described as dull aching pain. Pain persisted throughout the night, and resolved in the morning. He has been feeling lethargic, more short of breath since yesterday. He had recurrent chest pain this afternoon that lasted for few minutes and resolved on its own. He does have history of GERD, gastric carcinoid tumor. His current pain is not similar to his heartburn symptoms.     He does not take blood thinners or aspirin. No smoking history, does drink alcohol 2-3 times per week. He has pre-diabetes. No cardiac history except HTN, intermittent PVCs.     ED course:  Patient in stable condition on presentation. Trop 498. EKG NSR. Given aspirin.             PAST MEDICAL AND SURGICAL HISTORY      Past Medical History:   Diagnosis Date    Hypertension     Lipid disorder        No past surgical history on file.    PCP: Niki Zhao PA    MEDICATIONS      Prior to Admission medications    Medication Sig Start Date End Date Taking? Authorizing Provider   amLODIPine (NORVASC) 2.5 mg tablet Take 2.5 mg by mouth daily. 12/6/23  Yes Provider, [facility] MD Mikala   atorvastatin (LIPITOR) 20 mg tablet Take 20 mg by mouth daily. 10/25/24  Yes Provider,  Melina Bach MD   DULoxetine (CYMBALTA) 20 mg capsule  10/20/24  Yes Provider, Melina Bach MD   lisinopriL (PRINIVIL) 40 mg tablet Take 40 mg by mouth daily. 12/6/23  Yes Provider, Melina Bach MD       ALLERGIES      Patient has no known allergies.    FAMILY HISTORY      No family history on file.    SOCIAL HISTORY      Social History     Socioeconomic History    Marital status:      Spouse name: None    Number of children: None    Years of education: None    Highest education level: None   Tobacco Use    Smoking status: Never    Smokeless tobacco: Never   Substance and Sexual Activity    Alcohol use: Yes    Drug use: Never     Social Drivers of Health     Food Insecurity: No Food Insecurity (10/28/2024)    Hunger Vital Sign     Worried About Running Out of Food in the Last Year: Never true     Ran Out of Food in the Last Year: Never true       REVIEW OF SYSTEMS      All systems reviewed and negative except as noted in HPI.    PHYSICAL EXAMINATION      Temp:  [37 °C (98.6 °F)] 37 °C (98.6 °F)  Heart Rate:  [90-93] 90  Resp:  [16] 16  BP: (111-119)/(67-78) 111/67  Body mass index is 44.25 kg/m².    Physical Exam  Vitals reviewed.   Constitutional:       General: He is not in acute distress.     Appearance: Normal appearance. He is obese. He is not ill-appearing.   HENT:      Head: Atraumatic.      Right Ear: External ear normal.      Left Ear: External ear normal.      Nose: No rhinorrhea.   Eyes:      General:         Right eye: No discharge.         Left eye: No discharge.      Extraocular Movements: Extraocular movements intact.   Cardiovascular:      Rate and Rhythm: Normal rate and regular rhythm.      Heart sounds: Normal heart sounds.   Pulmonary:      Effort: Pulmonary effort is normal. No respiratory distress.      Breath sounds: Normal breath sounds. No wheezing.   Abdominal:      General: Abdomen is flat. There is no distension.      Palpations: Abdomen is soft.      Tenderness: There is  "no abdominal tenderness.   Musculoskeletal:      Cervical back: Normal range of motion and neck supple.      Right lower leg: No edema.      Left lower leg: No edema.   Skin:     General: Skin is warm and dry.   Neurological:      General: No focal deficit present.      Mental Status: He is alert and oriented to person, place, and time.   Psychiatric:         Mood and Affect: Mood normal.         LABS / IMAGING / EKG        Labs    Lab Results   Component Value Date     10/28/2024    K 3.8 10/28/2024     10/28/2024    CO2 24 10/28/2024    BUN 19 10/28/2024    CREATININE 1.0 10/28/2024    ALBUMIN 4.4 10/28/2024    ALT 30 10/28/2024    AST 23 10/28/2024    CALCIUM 9.7 10/28/2024    WBC 7.50 10/28/2024    HGB 15.9 10/28/2024    HCT 46.5 10/28/2024     10/28/2024       CBC Results         10/28/24     1913    WBC 7.50    RBC 4.87    HGB 15.9    HCT 46.5    MCV 95.5    MCH 32.6    MCHC 34.2              CMP Results         10/28/24     1913        K 3.8    Cl 103    CO2 24    Glucose 111    BUN 19    Creatinine 1.0    Calcium 9.7    Anion Gap 9    AST 23    ALT 30    Albumin 4.4    EGFR >60.0           Comment for K at 1913 on 10/28/24: Results obtained on plasma. Plasma Potassium values may be up to 0.4 mEQ/L less than serum values. The differences may be greater for patients with high platelet or white cell counts.    Comment for EGFR at 1913 on 10/28/24: Calculation based on the Chronic Kidney Disease Epidemiology Collaboration (CKD-EPI) equation refit without adjustment for race.            Troponin I Results         10/28/24     1913    HS Troponin I 498.7            Imaging  X-RAY CHEST 2 VIEWS    Result Date: 10/28/2024  IMPRESSION: No radiographic evidence of acute cardiopulmonary disease.                                  No results found for: \"COVID19\"    ECG/Telemetry  EKG NSR    ASSESSMENT AND PLAN           Carcinoid tumor of stomach  Assessment & Plan  > Hx of gastric " carcinoid, s/p resection    Anxiety  Assessment & Plan  Klonopin PRN    Lipid disorder  Assessment & Plan  Continue atorvastatin    Hypertension  Assessment & Plan  Continue home meds    * Elevated troponin  Assessment & Plan  > Elevated troponin. Intermittent chest pain, now resolved  Admit to telemetry  EKG in AM  BNP  TTE  Cardiology consult  Aspirin daily  NPO at midnight           VTE Assessment: [place] VTE Score: 1  VTE Prophylaxis: Current anticoagulants:    None      Code Status: Full Code      Discussed advanced care planning.  Estimated Discharge Date: 10/29/2024  Disposition Planning: TBD     I spent a total of 55 minutes providing patient care; including history taking, physical examination, reviewing and interpreting data, reviewing prior notes, placing orders, discussing with consultants and documenting.       This note is signed by the night admitting physician. Please contact this author for any issues or questions between 7PM-7AM (Secure chat preferred).  For any issues/questions after 7AM, please contact day time [facility] physician.     Nilson Leong MD  Hospitalist/[facility] Medicine Service  Pager: [phone] (Secure chat preferred)  10/28/24

## 2024-10-29 NOTE — PLAN OF CARE
Care Coordination Admission Assessment Note    General Information:  Readmission Within the last 30 days: no previous admission in last 30 days  Does patient have a : No  Patient-Specific Goals (include timeframe): patient wants to dc home when stable.    Living Arrangements:  Arrived From: home  Current Living Arrangements: home  People in Home: spouse  Home Accessibility: not wheelchair accessible  Living Arrangement Comments: patient keagan with wife in house with 2 praveen.his bed and bath is on the second level,    Housing Stability and Utility Access (SDOH):  In the last 12 months, was there a time when you were not able to pay the mortgage or rent on time?: No  In the past 12 months, how many times have you moved?:    At any time in the past 12 months, were you homeless or living in a shelter (including now)?: No  In the past 12 months has the electric, gas, oil, or water company threatened to shut off services in your home?:      Functional Status Prior to Admission:   Assistive Device/Animal Currently Used at Home: none  Functional Status Comments: patient ambulates I.  IADL Comments: Patient is I with is adl.     Supports and Services:  Current Outpatient/Agency/Support Group: none  Type of Current Home Care Services:    History of home care episode or rehab stay:      Discharge Needs Assessment:   Concerns to be Addressed: no discharge needs identified, denies needs/concerns at this time  Current Discharge Risk:    Anticipated Changes Related to Illness: none    Patient/Family Anticipated Discharge Plan:  Patient/Family Anticipates Transition To: home with family  Patient/Family Anticipated Services at Transition: none    Connection to Community  Patient declined offered resources.    Patient Choice:   Offered/Gave Vendor List: no  Patient's Choice of Community Agency(s):         Anticipated Discharge Plan:  Met with patient. Provided education and contact information for Care Coordination  services.: yes  Anticipated Discharge Disposition: home with assistance     Transportation Needs (SDOH):  Transportation Concerns: none  Transportation Anticipated: family or friend will provide  Is Out of Hospital DNR needed at discharge?: no    In the past 12 months, has lack of transportation kept you from medical appointments or from getting medications?: No  In the past 12 months, has lack of transportation kept you from meetings, work, or from getting things needed for daily living?: No    Concerns - comments: patient lives with wife in house with 2 praveen,his bed and bath is on the second level,he amb I and is I with his adl.he has never had a vn or been to ip rehab  PCP is alysa avila   Pharmacy [facility] in [place]  Patient is adm with nstemi,had a cardiac cath with 50% lad.patient will dc home with no needs when stable.

## 2024-10-29 NOTE — ASSESSMENT & PLAN NOTE
-- Telemetry today unremarkable, prefer patient to be monitored on telemetry floor  -- Echocardiogram completed, results pending physician interpretation  -- Patient for cardiac catheterization today due to elevated troponin (498 to 401 to 214)  -- As noted above, d-dimer > 20 but due to normal RV and IVC on preliminary echo findings plan was to first proceed with cardiac catheterization  -- Recommend outpatient event monitor if inpatient workup unremarkable

## 2024-10-29 NOTE — ED PROVIDER NOTES
"Emergency Medicine Note  HPI   HISTORY OF PRESENT ILLNESS       History provided by:  Patient, medical records, spouse and relative   used: No      64-year-old male with past medical history of hypertension and hyperlipidemia who presents emergency department for evaluation of chest discomfort.  Patient states last night around 10:45 PM he was walking his dog and felt dyspnea on exertion.  He returned home and had lightheadedness then was walking from his kitchen to his living room when he \"went down\".  Patient believes he syncopized for a few seconds.  He called out to his wife he found him getting up from the ground.  He then had a mild ache in the midsternal chest that was rated a 2 out of 10.  Patient patient was very lethargic the rest of the night.  Went to bed and woke up this morning with continued lethargy.  He had a mild episode of similar chest pain that occurred this afternoon and lasted seconds at a time.  He continued to have dyspnea on exertion throughout the the day.  Currently patient denies any chest discomfort.  He follows with cardiology for history of hypertension at [facility], last echocardiogram was this spring and last stress test was 2 years ago.        Patient History   PAST HISTORY     Reviewed from Nursing Triage:  Tobacco  Allergies  Meds  Problems  Med Hx  Surg Hx  Fam Hx  Soc   Hx      Past Medical History:   Diagnosis Date    Hypertension     Lipid disorder        No past surgical history on file.    No family history on file.    Social History     Tobacco Use    Smoking status: Never    Smokeless tobacco: Never   Substance Use Topics    Alcohol use: Yes    Drug use: Never         Review of Systems   REVIEW OF SYSTEMS     Review of Systems   Constitutional:  Positive for fatigue. Negative for chills and fever.   Respiratory:  Positive for shortness of breath. Negative for cough.    Cardiovascular:  Positive for chest pain. Negative for palpitations and leg " swelling.   Gastrointestinal:  Negative for abdominal pain, nausea and vomiting.   Musculoskeletal:  Negative for gait problem.   Neurological:  Negative for dizziness, syncope and light-headedness.         VITALS     ED Vitals      Date/Time Temp Pulse Resp BP SpO2 [facility]   10/28/24 2102 -- 90 16 111/67 95 % SMP   10/28/24 1901 37 °C (98.6 °F) 93 16 119/78 95 % MJB          Pulse Ox %: 95 % (10/28/24 2018)  Pulse Ox Interpretation: Normal (10/28/24 2018)  Heart Rate: 93 (10/28/24 2018)  Rhythm Strip Interpretation: Normal Sinus Rhythm (10/28/24 2018)  Vital Signs Review: Vital signs have been reviewed. The oxygen saturation is SpO2: 95 % which is normal      Physical Exam   PHYSICAL EXAM     Physical Exam  Vitals and nursing note reviewed.   Constitutional:       General: He is awake. He is not in acute distress.     Appearance: Normal appearance. He is well-developed and well-groomed. He is morbidly obese. He is not ill-appearing, toxic-appearing or diaphoretic.   HENT:      Head: Normocephalic and atraumatic.   Cardiovascular:      Rate and Rhythm: Normal rate and regular rhythm.      Heart sounds: Normal heart sounds.      Comments: No LE edema. Calves are symmetrical in appearance without any erythema, tenderness, or palpable cords.  Pulmonary:      Effort: Pulmonary effort is normal. No respiratory distress.      Breath sounds: Normal breath sounds and air entry.   Abdominal:      General: Abdomen is protuberant.      Palpations: Abdomen is soft.      Tenderness: There is no abdominal tenderness. There is no guarding or rebound.   Musculoskeletal:      Right lower leg: No edema.      Left lower leg: No edema.   Skin:     General: Skin is warm and dry.      Capillary Refill: Capillary refill takes less than 2 seconds.   Neurological:      Mental Status: He is alert and oriented to person, place, and time.      GCS: GCS eye subscore is 4. GCS verbal subscore is 5. GCS motor subscore is 6.   Psychiatric:          Behavior: Behavior is cooperative.           PROCEDURES     Procedures     DATA     Results       Procedure Component Value Units Date/Time    HS Troponin I (with 2 hour reflex) [[id number]]  (Abnormal) Collected: 10/28/24 1913    Specimen: Blood, Venous Updated: 10/28/24 2044     High Sens Troponin I 498.7 pg/mL     Comprehensive metabolic panel [[id number]]  (Abnormal) Collected: 10/28/24 1913    Specimen: Blood, Venous Updated: 10/28/24 1946     Sodium 136 mEQ/L      Potassium 3.8 mEQ/L      Comment: Results obtained on plasma. Plasma Potassium values may be up to 0.4 mEQ/L less than serum values. The differences may be greater for patients with high platelet or white cell counts.        Chloride 103 mEQ/L      CO2 24 mEQ/L      BUN 19 mg/dL      Creatinine 1.0 mg/dL      Glucose 111 mg/dL      Calcium 9.7 mg/dL      AST (SGOT) 23 IU/L      ALT (SGPT) 30 IU/L      Alkaline Phosphatase 53 IU/L      Total Protein 7.6 g/dL      Comment: Test performed on plasma which typically contains approximately 0.4 g/dL more protein than serum.        Albumin 4.4 g/dL      Bilirubin, Total 0.5 mg/dL      eGFR >60.0 mL/min/1.73m*2      Comment: Calculation based on the Chronic Kidney Disease Epidemiology Collaboration (CKD-EPI) equation refit without adjustment for race.        Anion Gap 9 mEQ/L     CBC and differential [[id number]] Collected: 10/28/24 1913    Specimen: Blood, Venous Updated: 10/28/24 1927     WBC 7.50 K/uL      RBC 4.87 M/uL      Hemoglobin 15.9 g/dL      Hematocrit 46.5 %      MCV 95.5 fL      MCH 32.6 pg      MCHC 34.2 g/dL      RDW 12.8 %      Platelets 157 K/uL      MPV 10.8 fL      Differential Type Auto     nRBC 0.0 %      Immature Granulocytes 0.1 %      Neutrophils 56.7 %      Lymphocytes 29.7 %      Monocytes 12.1 %      Eosinophils 0.9 %      Basophils 0.5 %      Immature Granulocytes, Absolute 0.01 K/uL      Neutrophils, Absolute 4.24 K/uL      Lymphocytes, Absolute 2.23 K/uL      Monocytes, Absolute  0.91 K/uL      Eosinophils, Absolute 0.07 K/uL      Basophils, Absolute 0.04 K/uL     [place] Draw Panel [[id number]] Collected: 10/28/24 1913    Specimen: Blood, Venous Updated: 10/28/24 1920    Narrative:      The following orders were created for panel order [place] Draw Panel.  Procedure                               Abnormality         Status                     ---------                               -----------         ------                     RAINBOW LT BLUE[[id number]]                                  In process                   Please view results for these tests on the individual orders.    HAMLET LT BLUE [[id number]] Collected: 10/28/24 1913    Specimen: Blood, Venous Updated: 10/28/24 1920            Imaging Results              X-RAY CHEST 2 VIEWS (Final result)  Result time 10/28/24 21:00:41      Final result                   Impression:    IMPRESSION:  No radiographic evidence of acute cardiopulmonary disease.                 Narrative:      CLINICAL HISTORY: Chest pain.  Shortness of breath.    COMMENT:  PA and lateral views of the chest were obtained.    Comparison: None    There is no focal consolidation or pleural effusion identified. The cardiac and  mediastinal silhouettes are unremarkable.  Degenerative changes are noted in the  regional osseous structures.                                      ECG 12 lead    (Results Pending)   ECG 12 lead    (Results Pending)   ECG 12 lead    (Results Pending)       Scoring tools                                  ED Course & MDM   MDM / ED COURSE / CLINICAL IMPRESSION / DISPO     Medical Decision Making  Problems Addressed:  NSTEMI (non-ST elevated myocardial infarction) (CMS/[facility]): acute illness or injury    Amount and/or Complexity of Data Reviewed  External Data Reviewed: labs, radiology, ECG and notes.  Labs: ordered. Decision-making details documented in ED Course.  Radiology: ordered. Decision-making details documented in ED Course.  ECG/medicine tests:  ordered.    Risk  OTC drugs.  Decision regarding hospitalization.        ED Course as of 10/29/24 0350   Mon Oct 28, 2024   2015 Received phone call that patient had a troponin of 490, this patient was in the waiting room and had not yet been assigned a provider, I made the charge nurse aware and requested he be moved to a room for evaluation [ES]   2019 Another ED provider took critical lab value on patient - troponin > 100. Patient moved from waiting room to ED 4.  [CK]   2037 CBC and differential  unremarkable [CK]   2037 Glucose(!): 111 [CK]   2053 High Sens Troponin I(!!): 498.7 [CK]   2054 HMS paged [CK]   2116 X-RAY CHEST 2 VIEWS  IMPRESSION:  No radiographic evidence of acute cardiopulmonary disease.   [CK]   2128 Case discussed with hospitalist (Dr. Leong). Reviewed patient's presentation, ED course, and relevant data. Hospitalist accepts patient on their service and will see/admit.  [CK]      ED Course User Index  [CK] Isela Soliz PA C  [ES] Kellen Morton PA C     Clinical Impression      NSTEMI (non-ST elevated myocardial infarction) (CMS/[facility])     _________________       ED Disposition   Admit / Observation                     Patient seen during a time of significantly increased volumes, increased boarding in ED, decreased capacity and staff which may have contributed to lengthened stay in ED. Portion of management and initial evaluation may have been done while in the waiting room because of this.  Extensive detailed charting also may be limited secondary to high ED volumes and may not reflect all conversations and decisions made between patient and provider.     This document was created using dragon dictation software.  There might be some typographical errors due to this technology.       Isela Soliz PA C  10/29/24 0350

## 2024-10-29 NOTE — NURSING NOTE
Received pt returning from VQ scan, A&Ox3, no c/o distress. VSS. Right radial bandage dry intact. Covid-19 test repeated as ordered. Pt instructed on strict bedrest as ordered. Safety maintained.

## 2024-10-29 NOTE — ED ATTESTATION NOTE
Procedures  Physical Exam  Review of Systems    10/28/90206:41 PM  I have personally seen and examined the patient.  I reviewed and agree with the PA/NP/Resident's assessment and plan of care.    My examination, assessment, and plan of care of Zaire Schwartz is as follows:  The patient presents with pressure in his anterior chest last night with exertional dyspnea and he felt lightheaded at 1 point and feels like he syncopized for a few seconds.  He did not injure himself.  He has recurrent chest pressure today.  He sees cardiology for control of his blood pressure.  He denies fevers or chills, cough, leg pain or swelling.  He had a 5-week trip in an  over the summer.  Exam: The patient is alert in no acute distress.  HEENT is normocephalic/atraumatic.  His heart is regular without murmur or ectopy.  He is not tachycardic.  His lungs are diminished but clear bilaterally.  His abdomen is soft and nontender.  He has no peripheral edema or swelling in his extremities and he is neurologically intact.  Impression/Plan: The patient was evaluated with EKG which revealed no evidence of acute ischemia.  The patient's initial troponin was over 100.  Chest x-ray revealed no active disease.  The patient is being treated with full dose aspirin and he will be admitted to the medical service for further evaluation and treatment.    Vital Sign Review: Vital signs have been ordered and reviewed. The oxygen saturation is 95% on room air, normal    MDM     I was physically present for the key/critical portions of the following procedures: None    This document was created using dragon dictation software.  There might be some typographical errors due to this technology.     Omar Juarez,   10/28/24 2044

## 2024-10-29 NOTE — PROGRESS NOTES
Hospital Medicine Service -  Daily Progress Note       SUBJECTIVE   Interval History: Patient presented with episode of chest pain, shortness of breath and loss of consciousness.    Underwent cardiac catheterization which showed 50% stenosis in LAD  VQ scan suggestive of PE     OBJECTIVE      Vital signs in last 24 hours:  Temp:  [36.3 °C (97.4 °F)-37 °C (98.6 °F)] 36.4 °C (97.5 °F)  Heart Rate:  [69-93] 69  Resp:  [13-18] 16  BP: (111-168)/(55-78) 116/59    Intake/Output Summary (Last 24 hours) at 10/29/2024 1652  Last data filed at 10/29/2024 1131  Gross per 24 hour   Intake 250 ml   Output 210 ml   Net 40 ml       PHYSICAL EXAMINATION        Constitutional: Awake, alert. No distress.  Morbidly obese.  HEMNT: Mucous membranes moist, pharynx normal.  Head: Normocephalic, atraumatic.  Eyes: EOM normal. No pallor seen.  Neck: Supple. No thyromegaly present.  Cardiovascular: Normal rate and regular rhythm. No murmur heard.  Pulmonary/Chest: Effort normal, breath sounds normal. No respiratory distress. No wheezes. No rales.  Abdominal: Soft. Bowel sounds are normal. No tenderness. No rebound.  Musculoskeletal: Normal range of motion. No edema. No tenderness.  Neurological: Awake, alert, oriented x 3. No focal deficits. No obvious cranial nerve deficits.  Skin: No pallor. No rashes.     LINES, CATHETERS, DRAINS, AIRWAYS, AND WOUNDS   Lines, Drains, and Airways:  Wounds (agree with documentation and present on admission):  Peripheral IV (Adult) 10/28/24 Left Antecubital (Active)   Number of days: 1       Catheterization Site - Arterial Left Radial 6 Fr. (Active)   Number of days: 0            LABS / IMAGING / TELE      Labs  BMP Results         10/29/24 10/28/24     0321 1913     136    K 4.2 3.8    Cl 104 103    CO2 24 24    Glucose 114 111    BUN 16 19    Creatinine 1.1 1.0    Calcium 9.4 9.7    Anion Gap 10 9    EGFR >60.0 >60.0           Comment for K at 1913 on 10/28/24: Results obtained on plasma. Plasma  Potassium values may be up to 0.4 mEQ/L less than serum values. The differences may be greater for patients with high platelet or white cell counts.    Comment for EGFR at 0321 on 10/29/24: Calculation based on the Chronic Kidney Disease Epidemiology Collaboration (CKD-EPI) equation refit without adjustment for race.    Comment for EGFR at 1913 on 10/28/24: Calculation based on the Chronic Kidney Disease Epidemiology Collaboration (CKD-EPI) equation refit without adjustment for race.            CBC Results         10/29/24 10/28/24     0321 1913    WBC 6.62 7.50    RBC 4.89 4.87    HGB 15.5 15.9    HCT 46.4 46.5    MCV 94.9 95.5    MCH 31.7 32.6    MCHC 33.4 34.2     157              Lab work reviewed by myself      Imaging  NUCLEAR MEDICINE LUNG VENTILATION PERFUSION AEROSOL   Final Result   IMPRESSION:      High probability for pulmonary embolism. Further evaluation with CTA is   suggested if possible.      Ultrasound venous leg bilateral   Final Result   IMPRESSION:   1. Suspected partially occlusive thrombus extending from the left mid femoral   vein through the calf veins with preserved flow though incomplete   compressibility.   2. No evidence of DVT in the right lower extremity.      Cardiac catheterization   Final Result      Transthoracic echo (TTE) complete   Final Result      ECG 12 lead         ECG 12 lead   Final Result      X-RAY CHEST 2 VIEWS   Final Result   IMPRESSION:   No radiographic evidence of acute cardiopulmonary disease.         ECG 12 lead   Final Result      X-RAY CHEST 2 VIEWS    (Results Pending)       Radiology reviewed by myself    ECG/Telemetry  I have independently reviewed the telemetry. No events for the last 24 hours.    ASSESSMENT AND PLAN               Assessment & Plan  Pulmonary embolism (CMS/HCC)  -VQ scan completed today shows high probability of PE.  Also ultrasound lower extremity positive for DVT.  -Ordered Lovenox 1 mg/kg SQ twice daily.  -Updated patient and  family regarding the results  -2D echo done which did not show any evidence of right ventricular dysfunction.  EF 70 to 75%.  -Patient underwent cardiac catheterization this morning.  Thus VQ scan was completed to avoid further IV contrast exposure.     -Patient was informed regarding risk and benefits of anticoagulation.  Risk of anticoagulation including high risk of GI, , CNS bleeding and increased risk of bleeding in the event of trauma or injury were explained to the patient and family.  They were also informed regarding indication for anticoagulation which is for treatment for acute DVT and PE which can be life-threatening if not treated.  Patient and family would like to go ahead with anticoagulation.    -Denies any history of significant bleeding in the past.  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  His last EGD last year was negative for any significant findings as per the patient.    -Patient reports he recently traveled cross-country in the car for 1 month.  He was traveling from August to 30 September.  Patient is morbidly obese which could be a risk factor for DVT/PE.  Patient's wife is currently positive for COVID-19.  I will order rechecking for COVID.      Elevated troponin  -Underwent cardiac catheterization which showed 50% LAD lesion.  Medical treatment recommended by cardiology.      Hypertension  Continue home meds  Lipid disorder  Continue atorvastatin  Anxiety  Klonopin PRN  Carcinoid tumor of stomach  > Hx of gastric carcinoid, s/p resection  Syncope  Patient presentation with syncope, likely related to acute PE    VTE Assessment: [place] VTE Score: 1  VTE Prophylaxis:  Current anticoagulants:  enoxaparin (LOVENOX) injection 135 mg, subcutaneous, q12h INT      Code Status: Full Code      Estimated Discharge Date: 10/30/2024     Disposition Planning: Admit to inpatient.  Not ready for discharge.       Mikhail Murphy MD  10/29/2024

## 2024-10-29 NOTE — PRE-PROCEDURE NOTE
Cardiac Cath Lab Pre-procedure Note    - Patient was seen and examined at bedside.  - The patient's chart and all data was reviewed.  - The procedure, treatment alternatives, risks and benefits were explained with specific risks discussed.  - Patient was consented for cardiac cath procedure and possible PCI.  - Patient's case was found appropriate for dual antiplatelet therapy.    Indication for procedure: Pre-Op Diagnosis Codes:      * NSTEMI (non-ST elevated myocardial infarction) (CMS/[facility]) [I21.4]    Patient's clinical presentation to the cardiac cath lab: unstable angina, and BUSTOS and syncope are anginal equivalent.      Patient appears to be managing well.         Total anti-anginal medications: 1.     Patient is presenting today with no CHF.    Pre-sedation assessment  ASA 3  Mallampati class: III - soft palate, base of uvula visible.

## 2024-10-30 ENCOUNTER — APPOINTMENT (INPATIENT)
Dept: RADIOLOGY | Facility: HOSPITAL | Age: 64
DRG: 280 | End: 2024-10-30
Attending: INTERNAL MEDICINE
Payer: COMMERCIAL

## 2024-10-30 ENCOUNTER — TELEPHONE (OUTPATIENT)
Dept: HEMATOLOGY/ONCOLOGY | Facility: CLINIC | Age: 64
End: 2024-10-30
Payer: COMMERCIAL

## 2024-10-30 VITALS
OXYGEN SATURATION: 96 % | SYSTOLIC BLOOD PRESSURE: 132 MMHG | DIASTOLIC BLOOD PRESSURE: 73 MMHG | BODY MASS INDEX: 45.01 KG/M2 | HEART RATE: 64 BPM | WEIGHT: 297 LBS | RESPIRATION RATE: 20 BRPM | TEMPERATURE: 98.1 F | HEIGHT: 68 IN

## 2024-10-30 PROBLEM — I82.412 ACUTE DEEP VEIN THROMBOSIS (DVT) OF FEMORAL VEIN OF LEFT LOWER EXTREMITY (CMS/HCC): Status: ACTIVE | Noted: 2024-10-30

## 2024-10-30 LAB
ATRIAL RATE: 75
P AXIS: 25
PR INTERVAL: 156
QRS DURATION: 82
QT INTERVAL: 408
QTC CALCULATION(BAZETT): 455
R AXIS: 59
T WAVE AXIS: 57
VENTRICULAR RATE: 75

## 2024-10-30 PROCEDURE — 63700000 HC SELF-ADMINISTRABLE DRUG: Performed by: STUDENT IN AN ORGANIZED HEALTH CARE EDUCATION/TRAINING PROGRAM

## 2024-10-30 PROCEDURE — 63600000 HC DRUGS/DETAIL CODE: Performed by: INTERNAL MEDICINE

## 2024-10-30 PROCEDURE — 93005 ELECTROCARDIOGRAM TRACING: CPT | Performed by: EMERGENCY MEDICINE

## 2024-10-30 PROCEDURE — 81241 F5 GENE: CPT | Performed by: INTERNAL MEDICINE

## 2024-10-30 PROCEDURE — 85610 PROTHROMBIN TIME: CPT | Performed by: INTERNAL MEDICINE

## 2024-10-30 PROCEDURE — 99223 1ST HOSP IP/OBS HIGH 75: CPT | Performed by: INTERNAL MEDICINE

## 2024-10-30 PROCEDURE — 86147 CARDIOLIPIN ANTIBODY EA IG: CPT | Performed by: INTERNAL MEDICINE

## 2024-10-30 PROCEDURE — 86146 BETA-2 GLYCOPROTEIN ANTIBODY: CPT | Performed by: INTERNAL MEDICINE

## 2024-10-30 PROCEDURE — 36415 COLL VENOUS BLD VENIPUNCTURE: CPT | Performed by: INTERNAL MEDICINE

## 2024-10-30 PROCEDURE — 71046 X-RAY EXAM CHEST 2 VIEWS: CPT

## 2024-10-30 PROCEDURE — 99239 HOSP IP/OBS DSCHRG MGMT >30: CPT | Performed by: STUDENT IN AN ORGANIZED HEALTH CARE EDUCATION/TRAINING PROGRAM

## 2024-10-30 PROCEDURE — 81240 F2 GENE: CPT | Performed by: INTERNAL MEDICINE

## 2024-10-30 RX ORDER — RIVAROXABAN 15 MG-20MG
KIT ORAL
Qty: 51 TABLET | Refills: 0 | Status: SHIPPED | OUTPATIENT
Start: 2024-10-30 | End: 2024-11-15

## 2024-10-30 RX ORDER — NAPROXEN SODIUM 220 MG/1
81 TABLET, FILM COATED ORAL DAILY
Qty: 30 TABLET | Refills: 0 | Status: SHIPPED | OUTPATIENT
Start: 2024-10-31 | End: 2025-02-21

## 2024-10-30 RX ORDER — RIVAROXABAN 15 MG-20MG
KIT ORAL
Qty: 51 TABLET | Refills: 0 | Status: SHIPPED | OUTPATIENT
Start: 2024-10-30 | End: 2024-10-30

## 2024-10-30 RX ADMIN — RIVAROXABAN 15 MG: 15 TABLET, FILM COATED ORAL at 16:06

## 2024-10-30 RX ADMIN — AMLODIPINE BESYLATE 2.5 MG: 2.5 TABLET ORAL at 08:46

## 2024-10-30 RX ADMIN — ENOXAPARIN SODIUM 135 MG: 150 INJECTION SUBCUTANEOUS at 05:10

## 2024-10-30 RX ADMIN — DULOXETINE HYDROCHLORIDE 40 MG: 20 CAPSULE, DELAYED RELEASE ORAL at 08:45

## 2024-10-30 RX ADMIN — LISINOPRIL 40 MG: 20 TABLET ORAL at 08:45

## 2024-10-30 RX ADMIN — ATORVASTATIN CALCIUM 20 MG: 20 TABLET, FILM COATED ORAL at 08:45

## 2024-10-30 RX ADMIN — ASPIRIN 81 MG 81 MG: 81 TABLET ORAL at 08:45

## 2024-10-30 ASSESSMENT — COGNITIVE AND FUNCTIONAL STATUS - GENERAL
MOVING TO AND FROM BED TO CHAIR: 4 - NONE
STANDING UP FROM CHAIR USING ARMS: 4 - NONE
CLIMB 3 TO 5 STEPS WITH RAILING: 4 - NONE
WALKING IN HOSPITAL ROOM: 4 - NONE

## 2024-10-30 NOTE — DISCHARGE SUMMARY
Hospital Medicine Service -  Inpatient Discharge Summary        BRIEF OVERVIEW   Admitting Provider: Mikhail Murphy MD  Attending Provider: Doug Jovel MD Attending phys phone: [phone]    PCP: Niki Zhao [phone]    Admission Date: 10/28/2024  Discharge Date: 10/30/2024     DISCHARGE DIAGNOSES      Primary Discharge Diagnosis  Elevated troponin    Secondary Discharge Diagnoses  Active Hospital Problems    Diagnosis Date Noted    Syncope 10/29/2024    Pulmonary embolism (CMS/HCC) 10/29/2024    Elevated troponin 10/28/2024    Anxiety 10/28/2024    Hypertension     Lipid disorder     Carcinoid tumor of stomach 05/22/2013      Resolved Hospital Problems   No resolved problems to display.       Problem List on Day of Discharge  No new Assessment & Plan notes have been filed under this hospital service since the last note was generated.  Service: Hospitalist    SUMMARY OF HOSPITALIZATION      Presenting Problem/History of Present Illness  This is a 64 y.o. year-old male admitted on 10/28/2024 with Elevated troponin [R79.89]  NSTEMI (non-ST elevated myocardial infarction) (CMS/HCC) [I21.4].      Hospital Course  Zaire Schwartz is a 64 y.o. male with a past medical history of HTN, HLD, gastric carcinoid tumor, morbid obesity who presents with chest pain, admitted for NSTEMI. His echocardiogram revealed preserved LVEF, normal RV size and systolic function. He underwent cardiac cath- proximal LAD lesion is 50% stenosed, LVEF 65%, systolic function is normal. Cardiology recommends medical therapy.     He underwent V/Q scan- and was found to have high prob for pulmonary embolism. His US of LE's revealed- partially occlusive thrombus extending from the left mid femoral vein through the calf veins with preserved flow though incomplete compressibility. He was started on subc Lovenox and then transitioned to Xarelto. Heme/onc evaluated the patient and ordered hypercoag workup. His echo- EF 70-75%, no  wall motion abnormalities. His development of clots was deemed secondary to prolonged immobilization from driving cross-country or 1 month. He will f/u the results of the hypercoag with hematology as outpatient.     He did not require any supplemental oxygen.   He is medically stable to be discharged.     Exam on Day of Discharge  PE:   Gen- in no acute distress,  CV: RRR, No M/G, no JVD  Pulm: CTA B/l, no wheezes or rhonchi, no rales  Abd: Soft, NT, ND, normal bowel sounds  Extremities: normal range of motion      Consults During Admission  IP CONSULT TO CARDIOLOGY  IP CONSULT TO HEMATOLOGY/ONCOLOGY    DISCHARGE MEDICATIONS               Medication List        START taking these medications      aspirin 81 mg chewable tablet  Start taking on: October 31, 2024  Take 1 tablet (81 mg total) by mouth daily.  Dose: 81 mg     XARELTO DVT-PE TREAT 30D START 15 mg (42)- 20 mg (9) tablets,dose pack  Take 15 mg by mouth twice daily with meal for 21 days, then take 20 mg by mouth daily with meal.  Generic drug: rivaroxaban            CONTINUE taking these medications      amLODIPine 2.5 mg tablet  Commonly known as: NORVASC  Take 2.5 mg by mouth daily.  Dose: 2.5 mg     atorvastatin 20 mg tablet  Commonly known as: LIPITOR  Take 20 mg by mouth daily.  Dose: 20 mg     celecoxib 100 mg capsule  Commonly known as: celeBREX  Take 100-200 mg by mouth daily as needed for pain. 1 or 2 capsules  Dose: 100-200 mg     DULoxetine 20 mg capsule  Commonly known as: CYMBALTA  Take 40 mg by mouth daily. 2 capsules  Dose: 40 mg     ketoconazole 2 % cream  Commonly known as: NIZORAL  Apply 1 Application topically 2 (two) times a day as needed for rash.  Dose: 1 Application     lisinopriL 40 mg tablet  Commonly known as: PRINIVIL  Take 40 mg by mouth daily.  Dose: 40 mg                  PROCEDURES / LABS / IMAGING          Pertinent Labs,   Pertinent Imaging  Results from last 7 days   Lab Units 10/29/24  0321   WBC K/uL 6.62   HEMOGLOBIN g/dL  15.5   HEMATOCRIT % 46.4   PLATELETS K/uL 156     Results from last 7 days   Lab Units 10/29/24  0321   SODIUM mEQ/L 138   POTASSIUM mEQ/L 4.2   CHLORIDE mEQ/L 104   CO2 mEQ/L 24   BUN mg/dL 16   CREATININE mg/dL 1.1   GLUCOSE mg/dL 114*   CALCIUM mg/dL 9.4     Microbiology Results       Procedure Component Value Units Date/Time    SARS-CoV-2 (COVID-19), PCR Nasopharynx [[id number]]  (Normal) Collected: 10/29/24 1721    Specimen: Nasopharyngeal Swab from Nasopharynx Updated: 10/29/24 1818     SARS-CoV-2 (COVID-19) Negative    Narrative:      Testing performed using real-time PCR for detection of COVID-19. EUA approved validation studies performed on site.     SARS-COV-2 (COVID-19)/ FLU A/B, AND RSV, PCR Nasopharynx [[id number]]  (Normal) Collected: 10/29/24 0321    Specimen: Nasopharyngeal Swab from Nasopharynx Updated: 10/29/24 0419     SARS-CoV-2 (COVID-19) Negative     Influenza A Negative     Influenza B Negative     Respiratory Syncytial Virus Negative    Narrative:      Testing performed using real-time PCR for detection of COVID-19. EUA approved validation studies performed on site.               X-RAY CHEST 2 VIEWS    Result Date: 10/30/2024  IMPRESSION: No active disease in the chest.    NUCLEAR MEDICINE LUNG VENTILATION PERFUSION AEROSOL    Addendum Date: 10/29/2024    Dr JACKSON Murphy in/at [facility] was called at 5:27pm on 10/29/2024 and advised radiology results are now available for viewing.    Result Date: 10/29/2024  IMPRESSION: High probability for pulmonary embolism. Further evaluation with CTA is suggested if possible.    Ultrasound venous leg bilateral    Result Date: 10/29/2024  IMPRESSION: 1. Suspected partially occlusive thrombus extending from the left mid femoral vein through the calf veins with preserved flow though incomplete compressibility. 2. No evidence of DVT in the right lower extremity.    X-RAY CHEST 2 VIEWS    Result Date: 10/28/2024  IMPRESSION: No radiographic evidence of acute  cardiopulmonary disease.      OUTPATIENT  FOLLOW-UP / REFERRALS / PENDING TESTS        Outpatient Follow-Up Appointments  Encounter Information    This patient does not currently have any appointments scheduled.         Referrals  No orders of the defined types were placed in this encounter.      Test Results Pending at Discharge  Unresulted Labs (From admission, onward)       Start     Ordered    10/30/24 1549  Lupus Anticoagulant w/ Intrpn  Once        Question:  Release to patient  Answer:  Immediate    10/30/24 1548    10/30/24 1549  Lupus Anticoagulant Interpretation  PROCEDURE ONCE        Question:  Release to patient  Answer:  Immediate    10/30/24 1548    10/30/24 1549  Lupus Anticoagulant  PROCEDURE ONCE        Question:  Release to patient  Answer:  Immediate    10/30/24 1548    10/30/24 1548  Beta-2 glycoprotein antibodies  Once        Question:  Release to patient  Answer:  Immediate    10/30/24 1548    10/30/24 1548  Cardiolipin antibody  Once        Question:  Release to patient  Answer:  Immediate    10/30/24 1548    10/30/24 1544  Factor 5 Leiden  Once        Question:  Release to patient  Answer:  Immediate    10/30/24 1543    10/30/24 1544  Prothrombin gene mutation  Once        Question:  Release to patient  Answer:  Immediate    10/30/24 1543    10/29/24 1053  HIGH SENSITIVE TROPONIN I (NO REFLEX)  PROCEDURE ONCE        Question:  Release to patient  Answer:  Immediate    10/29/24 0937    10/28/24 1906  [place] Draw Panel  STAT        Question Answer Comment   Red Top No Labels    Gold Top No Labels    Light Blue 1 Label    Light Green Top No Labels    Lavender Top No Labels    Pink Top No Labels    Yellow - Urine Tall No Labels    Urine Culture Tube No Labels    Release to patient Immediate        10/28/24 1905                    Important Issues to Address in Follow-Up  F/u with PCP, Heme/onc    DISCHARGE DISPOSITION AND DESTINATION      Disposition:    Destination:                               Code Status At Discharge: Full Code    Physician Order for Life-Sustaining Treatment Document Status        No documents found

## 2024-10-30 NOTE — PLAN OF CARE
Was informed by Hematology/Onc that the patient wanted his medications sent to a different pharmacy.  Called patient's cell number regarding new pharmacy information- going to VM- left a message.

## 2024-10-30 NOTE — TELEPHONE ENCOUNTER
After Hours Call    Patient discharged today after being seen by Dr Blum prior to discharge. [facility] did not carry the xarelto starter pack. I did call it in to a [facility] on [address] which he was told carried this.    Patient to call and schedule follow up with Dr Blum tomorrow.    He is not due for his next dose until tomorrow morning. He was given a 5pm dose prior to discharge

## 2024-10-30 NOTE — NURSING NOTE
1900- 0700: Pt walked to the bathroom once after her was educated and informed that he is on bedrest. Pt was also educated on the implication of not following bedrest orders. Pt stated he has been allowed to get up to the bathroom and he prefer to walk to the bathroom rather than use the urinal.

## 2024-10-30 NOTE — NURSING NOTE
Patient deemed adequate for discharge.  All discharged instructions reviewed with patient.  All belongings returned to the patient on discharge

## 2024-10-30 NOTE — PLAN OF CARE
Plan of Care Review  Plan of Care Reviewed With: patient  Progress: improving  Outcome Evaluation: AAOx3, SR on the monitor.  Patient refusing urinal, and ambulates to the bathroom.  Lungs diminished.  For xray this am.

## 2024-10-31 ENCOUNTER — TELEPHONE (OUTPATIENT)
Dept: SCHEDULING | Facility: CLINIC | Age: 64
End: 2024-10-31
Payer: COMMERCIAL

## 2024-10-31 LAB
B2 GLYCOPROT1 IGA SERPL IA-ACNC: <9.4 SAU
B2 GLYCOPROT1 IGG SERPL IA-ACNC: <9.4 SGU
B2 GLYCOPROT1 IGM SERPL IA-ACNC: <9.4 SMU
CARDIOLIPIN IGA SER IA-ACNC: <9.4 APL U/ML
CARDIOLIPIN IGG SER IA-ACNC: <9.4 GPL U/ML
CARDIOLIPIN IGM SER IA-ACNC: <9.4 MPL U/ML
F2 C.20210G>A GENO BLD/T: NORMAL
F5 P.R506Q BLD/T QL: NORMAL

## 2024-10-31 NOTE — ASSESSMENT & PLAN NOTE
He appears to have a provoked DVT and PE ( travel and vaccination).  For provoked event, would recommend 3-6 months of anticoagulation.  He is being transitioned to Xarelto  However, also has family history of mother with recurrent miscarriages.  Therefore, will request Factor V leiden, Prothrombin gene mutation and Lupus Anticoagulant.  Based on results, will determine duration of anticoagulation.  Dicussed with patient precautions to take while on anticoagulation, especially with increased bleeding risk.  Based on how symptoms improve, will determine if a CT chest will be done in 3 months prior to discontinuing anticoagulation.  All of his questions were answered to his satisfaction.

## 2024-10-31 NOTE — CONSULTS
Consult Note    Subjective     Zaire Schwartz is a 64 y.o. male who was admitted for Elevated troponin [R79.89]  NSTEMI (non-ST elevated myocardial infarction) (CMS/HCC) [I21.4]. Patient was referred by Dr. Gar for management recommendations. Patient is consult a 64 year-old. male with a past medical history of HTN, HLD, gastric carcinoid tumor, morbid obesity who presented with chest pain, progressive shortness of breath and syncope. His echocardiogram revealed preserved LVEF, normal RV size and systolic function. He underwent cardiac catherization which revealed a proximal LAD lesion is 50% stenosed, LVEF 65%, systolic function is normal. Cardiology recommends medical therapy.      He underwent V/Q scan- and was found to have high prob for pulmonary embolism. His US of LE's revealed- partially occlusive thrombus extending from the left mid femoral vein through the calf veins with preserved flow though incomplete compressibility. He was started on subcutaneous Lovenox and then transitioned to Xarelto. Hematology was consulted     He did a cross country trip from end of August to end of September 2024.  He said would take frequent breaks while driving.  He also had flu and covid vaccines on 10/5/24.  Wife currently has covid 19 infection.  He tested negative.    Symptoms of fatigue, progressive dyspnea occurred over the last few weeks.    No previous history of VTE  Mother had 5 miscarriages  No family history of early CVA or MI  Personal history of malignancy - gastric carcinoid, resected in 2014.  No recurrence.    No hormonal therapy, supplements or herbal medications    Outside records reviewed. Pertinent radiology results reviewed. Pertinent lab results reviewed.    Medical History:   Past Medical History:   Diagnosis Date    Hypertension     Lipid disorder        Surgical History:   Past Surgical History   Procedure Laterality Date    Fracture surgery      Hernia repair      iFR - initial vessel N/A  10/29/2024    Performed by Dar Huynh MD at  CARDIAC CATH/EP/NEURO    LEFT HEART CATH WITH CORONARY ANGIOGRAPHY N/A 10/29/2024    Performed by Dar Huynh MD at  CARDIAC CATH/EP/NEURO       Allergies: Patient has no known allergies.    [unfilled]     Social History:   Social History     Socioeconomic History    Marital status:      Spouse name: None    Number of children: None    Years of education: None    Highest education level: None   Tobacco Use    Smoking status: Never    Smokeless tobacco: Never   Vaping Use    Vaping status: Never Used   Substance and Sexual Activity    Alcohol use: Yes    Drug use: Never     Social Drivers of Health     Food Insecurity: No Food Insecurity (10/28/2024)    Hunger Vital Sign     Worried About Running Out of Food in the Last Year: Never true     Ran Out of Food in the Last Year: Never true   Transportation Needs: No Transportation Needs (10/29/2024)    PRAPARE - Transportation     Lack of Transportation (Medical): No     Lack of Transportation (Non-Medical): No   Housing Stability: Unknown (10/29/2024)    Housing Stability Vital Sign     Unable to Pay for Housing in the Last Year: No     Homeless in the Last Year: No       Family History: No family history on file.    Review of Systems  All other systems reviewed and negative except as noted in the HPI.    Vital signs in last 24 hours:  Temp:  [36.1 °C (97 °F)-36.7 °C (98.1 °F)] 36.7 °C (98.1 °F)  Heart Rate:  [62-92] 64  Resp:  [17-20] 20  BP: (100-132)/(49-73) 132/73    Objective     Physical Exam  General appearance: alert, appears stated age, and cooperative  Head: normocephalic, without obvious abnormality, atraumatic  Eyes: conjunctivae clear. PERRL, EOM's intact.  Neck: no adenopathy and supple, symmetrical, trachea midline  Lungs: clear to auscultation bilaterally  Heart: regular rate and rhythm, S1, S2 normal, no murmur, click, rub or gallop  Abdomen: soft, non-tender; bowel sounds normal; no  masses, no organomegaly  Extremities: no edema, redness or tenderness in the calves or thighs  Lymph nodes: Cervical and supraclavicular nodes normal. and Axillary nodes normal.  Neurologic: Grossly normal        Labs  Results from last 7 days   Lab Units 10/29/24  0321 10/28/24  1913   WBC K/uL 6.62 7.50   HEMOGLOBIN g/dL 15.5 15.9   HEMATOCRIT % 46.4 46.5   PLATELETS K/uL 156 157   DIFF TYPE   --  Auto   NRBC %  --  0.0   IMM GRANULOCYTES %  --  0.1   NEUTROPHILS %  --  56.7   LYMPHOCYTES %  --  29.7   MONOCYTES %  --  12.1   EOSINOPHILS %  --  0.9   BASOPHILS %  --  0.5   IMM GRANUCOCYTES ABS K/uL  --  0.01   MONO ABS AUTO K/uL  --  0.91   EOS ABS AUTO K/uL  --  0.07   BASO ABS AUTO K/uL  --  0.04       Results from last 7 days   Lab Units 10/29/24  0321 10/28/24  1913   SODIUM mEQ/L 138 136   POTASSIUM mEQ/L 4.2 3.8   CHLORIDE mEQ/L 104 103   CO2 mEQ/L 24 24   BUN mg/dL 16 19   CREATININE mg/dL 1.1 1.0   CALCIUM mg/dL 9.4 9.7   ALBUMIN g/dL  --  4.4   BILIRUBIN TOTAL mg/dL  --  0.5   ALK PHOS IU/L  --  53   ALT IU/L  --  30   AST IU/L  --  23   GLUCOSE mg/dL 114* 111*       Imaging  I have independently reviewed the patient's pertinent imaging for this hospital visit. .  X-RAY CHEST 2 VIEWS    Result Date: 10/30/2024  Narrative: CLINICAL HISTORY: VQ scan TECHNIQUE: Frontal and lateral views of the chest were obtained COMPARISON: PET scan dated 10/20/2024, chest x-ray dated 10/20/2024 COMMENT: The lungs are clear without infiltrate, effusion or pneumothorax. The cardiomediastinal silhouette is within normal limits. Degenerative changes in the spine.     Impression: IMPRESSION: No active disease in the chest.    NUCLEAR MEDICINE LUNG VENTILATION PERFUSION AEROSOL    Addendum Date: 10/29/2024 Addendum:   [place] JACKSON Murphy [place] [facility] [place] at 5:27pm on 10/29/2024 and advised radiology results are now available for viewing.    Result Date: 10/29/2024  Narrative: CLINICAL HISTORY: Positive d-dimer. COMPARISON: Lower  extremity venous duplex sonography 10/29/2024. Chest x-ray 10/28/2024. Pulmonary perfusion and ventilation scintigraphic imaging was performed utilizing 38.4 mCi of Tc99m DTPA aerosol and 4.4 mCi of Tc99m MAA.  Ventilation and perfusion images were acquired in multiple projections. COMMENT: There is a large mismatched defect in the right midlung. There is a small to moderate mismatched defect in the left midlung.     Impression: IMPRESSION: High probability for pulmonary embolism. Further evaluation with CTA is suggested if possible.    Ultrasound venous leg bilateral    Result Date: 10/29/2024  Narrative: CLINICAL HISTORY: New onset left lower extremity swelling COMPARISON: None. COMMENT: Technique: Grayscale, Color Doppler, and Spectral Doppler (Duplex) ultrasound of both lower extremities was performed, utilizing compression. LEFT Leg: Common femoral vein - Normally compressible with spontaneous phasic flow. No evidence of thrombus. Femoral vein - suspected partially occlusive thrombus involving the mid femoral through the calf veins with preserved flow. Deep femoral vein - Normally compressible with spontaneous phasic flow. No evidence of thrombus. Popliteal vein - suspected partially occlusive thrombus with preserved flow though incomplete compressibility. Calf veins - suspected partially occlusive thrombus with preserved flow though incomplete compressibility of the gastrocnemius, poster tibial, and peroneal veins. RIGHT Leg: Common femoral vein - Normally compressible with spontaneous phasic flow. No evidence of thrombus. Femoral vein - Normally compressible with spontaneous phasic flow. No evidence of thrombus. Deep femoral vein - Normally compressible with spontaneous phasic flow. No evidence of thrombus. Popliteal vein - Normally compressible with spontaneous phasic flow. No evidence of thrombus. Calf veins - The gastrocnemius, poster tibial, and peroneal veins are unremarkable..     Impression:  IMPRESSION: 1. Suspected partially occlusive thrombus extending from the left mid femoral vein through the calf veins with preserved flow though incomplete compressibility. 2. No evidence of DVT in the right lower extremity.    Transthoracic echo (TTE) complete    Result Date: 10/29/2024  Narrative:   Left Ventricle: Normal ventricle size. Normal wall thickness. Preserved systolic function. Estimated EF 70-75%. Wall motion appears grossly normal. Normal diastolic filling pattern for age.   Right Ventricle: Normal ventricle size. Normal systolic function.   Left Atrium: Normal sized atrium.   Right Atrium: Normal sized atrium.   Aortic Valve: Normal structure. No regurgitation. No stenosis.   Mitral Valve: Normal leaflet structure. Normal leaflet motion. Trace regurgitation. No stenosis.   Tricuspid Valve: Normal structure. Trace regurgitation. Estimated RVSP = 22 mmHg. No significant stenosis.   Pulmonic Valve: Normal structure. Trace regurgitation. No stenosis.   Pericardium: Normal structure. No evidence of pericardial effusion. No cardiac tamponade.   Aorta: Mild dilatation at the sinuses of Valsalva. Mild dilatation of the ascending aorta.   IVC/SVC: Inferior vena cava is <2.1cm. Inferior vena cava collapses >50% during inspiration. Grossly normal LVEF approximately 75% with normal wall motion.     Cardiac catheterization    Result Date: 10/29/2024  Narrative:   Prox LAD lesion is 50% stenosed. IFR = 0.96   Normal LVEDP   Left ventricular ejection fraction is approximately 65%.   Left ventricular systolic function is normal. RECOMMENDATIONS:  1.  Medical therapy     X-RAY CHEST 2 VIEWS    Result Date: 10/28/2024  Narrative: CLINICAL HISTORY: Chest pain.  Shortness of breath. COMMENT: PA and lateral views of the chest were obtained. Comparison: None There is no focal consolidation or pleural effusion identified. The cardiac and mediastinal silhouettes are unremarkable.  Degenerative changes are noted in the  regional osseous structures.     Impression: IMPRESSION: No radiographic evidence of acute cardiopulmonary disease.       Assessment   64 y.o. male being consulted for management recommendations       Plan     Acute deep vein thrombosis (DVT) of femoral vein of left lower extremity (CMS/HCC)  Assessment & Plan  See PE    Pulmonary embolism (CMS/[facility])  Assessment & Plan  He appears to have a provoked DVT and PE ( travel and vaccination).  For provoked event, would recommend 3-6 months of anticoagulation.  He is being transitioned to Xarelto  However, also has family history of mother with recurrent miscarriages.  Therefore, will request Factor V leiden, Prothrombin gene mutation and Lupus Anticoagulant.  Based on results, will determine duration of anticoagulation.  Dicussed with patient precautions to take while on anticoagulation, especially with increased bleeding risk.  Based on how symptoms improve, will determine if a CT chest will be done in 3 months prior to discontinuing anticoagulation.  All of his questions were answered to his satisfaction.

## 2024-10-31 NOTE — TELEPHONE ENCOUNTER
New Patient Appointment FYI    Name of caller: Zaire Schwartz     Reason for Visit: est care after hospital     Insurance: [employer]    Referred by: Dr Javier    Primary Care Physician: Niki Zhao PA     Previous Cardiologist name and phone number: none    Best contact number: [phone]     Additional notes/History: Pt saw Dr Javier when he was in the hospital and was told to follow up with him after discharge.

## 2024-11-01 LAB
ATRIAL RATE: 65
P AXIS: 75
PR INTERVAL: 150
QRS DURATION: 78
QT INTERVAL: 422
QTC CALCULATION(BAZETT): 438
R AXIS: 62
T WAVE AXIS: 72
VENTRICULAR RATE: 65

## 2024-11-06 LAB
LA PPP-IMP: NORMAL
PROTHROMBIN TIME: 14.5 SEC (ref 12.2–14.5)
SCREEN APTT: 37 SEC (ref 31–44)
SCREEN DRVVT: 41 SEC (ref 31–44)

## 2024-11-15 ENCOUNTER — OFFICE VISIT (OUTPATIENT)
Dept: HEMATOLOGY/ONCOLOGY | Facility: CLINIC | Age: 64
End: 2024-11-15
Payer: COMMERCIAL

## 2024-11-15 VITALS
HEART RATE: 59 BPM | OXYGEN SATURATION: 96 % | WEIGHT: 287 LBS | SYSTOLIC BLOOD PRESSURE: 145 MMHG | TEMPERATURE: 97.9 F | BODY MASS INDEX: 43.5 KG/M2 | HEIGHT: 68 IN | DIASTOLIC BLOOD PRESSURE: 80 MMHG

## 2024-11-15 DIAGNOSIS — I82.412 ACUTE DEEP VEIN THROMBOSIS (DVT) OF FEMORAL VEIN OF LEFT LOWER EXTREMITY (CMS/HCC): ICD-10-CM

## 2024-11-15 DIAGNOSIS — I26.99 ACUTE PULMONARY EMBOLISM, UNSPECIFIED PULMONARY EMBOLISM TYPE, UNSPECIFIED WHETHER ACUTE COR PULMONALE PRESENT (CMS/HCC): Primary | ICD-10-CM

## 2024-11-15 PROCEDURE — 99214 OFFICE O/P EST MOD 30 MIN: CPT

## 2024-11-15 PROCEDURE — 3008F BODY MASS INDEX DOCD: CPT

## 2024-11-15 ASSESSMENT — ENCOUNTER SYMPTOMS
LIGHT-HEADEDNESS: 1
RESPIRATORY NEGATIVE: 1
EYES NEGATIVE: 1
HEMATOLOGIC/LYMPHATIC NEGATIVE: 1
GASTROINTESTINAL NEGATIVE: 1
NERVOUS/ANXIOUS: 1
CARDIOVASCULAR NEGATIVE: 1
HEADACHES: 1
ARTHRALGIAS: 1
BACK PAIN: 1
CONSTITUTIONAL NEGATIVE: 1
ENDOCRINE NEGATIVE: 1

## 2024-11-15 ASSESSMENT — PAIN SCALES - GENERAL: PAINLEVEL_OUTOF10: 2

## 2024-11-15 NOTE — ASSESSMENT & PLAN NOTE
. Zaire Schwartz returns today for initial hematology outpatient consultation regarding provoked DVT and PE likely due to cross-country travel during August and September 2024 and multiple vaccinations with flu and COVID on 10/5/2024.  His hypercoagulable workup has been negative for factor V Leiden, prothrombin gene mutation, lupus anticoagulant, beta-2 glycoprotein and cardiolipin antibodies.  He is currently taking aspirin in addition to his Xarelto.  He denies any signs or symptoms of obvious bleeding or bruising.  His left lower extremity swelling has improved and is no longer short of breath or having chest pain.  I have asked him to stop taking his aspirin as long as his cardiologist approves of this as taking these concurrently can cause increased bleeding and risk for GI ulcers.    I have sent a refill to his pharmacy today to [facility] for Xarelto.  Tentative plan is for him to complete at least 3 months of Xarelto therapy, he is going to have repeat labs at the end of January for another provider and at that time we will also repeat a BMP and a D-dimer.  The BMP will evaluate his kidney function in the event that we need to do a repeat CTA of the chest.  If his D-dimer is positive, will proceed with CTA of the chest as well.  Depending on the results of the CT of the chest may need to continue therapy for 6 months total duration of therapy.  Ample amount of time was provided for patient and his wifes questions.  He was encouraged to reach out with any concerns or questions in the interim.  Tentative plan is to see him back in mid February 2025.

## 2024-11-15 NOTE — LETTER
November 15, 2024                                                                                                                             Patient: Manish Schwartz   YOB: 1960   Date of Visit: 11/15/2024       Dear Dr. Javier:    Thank you for referring Manish Schwartz to me for evaluation at [facility]. Attached is my assessment and plan of care.    If you have questions, please do not hesitate to call me. I look forward to following Manish Schwartz along with you.           Sincerely,        FRANDY Dorman    CC: No Recipients

## 2024-11-15 NOTE — PROGRESS NOTES
Zaire Schwartz is a 64 y.o. male,   :  1960  REFERRING PHYSICIAN:   No referring provider defined for this encounter.  PRIMARY CARE PROVIDER:  William Javier MD    Encounter Diagnosis   Name Primary?    Acute pulmonary embolism, unspecified pulmonary embolism type, unspecified whether acute cor pulmonale present (CMS/HCC) Yes     Patient Active Problem List   Diagnosis    Elevated troponin    Hypertension    Lipid disorder    Anxiety    Carcinoid tumor of stomach    Syncope    Pulmonary embolism (CMS/HCC)    Acute deep vein thrombosis (DVT) of femoral vein of left lower extremity (CMS/HCC)       History of Present Illness  Mr. Zaire Schwartz presents today for initial outpatient hematology consultation regarding newly diagnosed pulmonary embolism and left lower extremity DVT on 10/29/2024. He was seen for initial hematology consultation by Dr. Pallavi Rastogi on 10/30/2024 for management recommendations.  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  He presented to the emergency department with chest pain, shortness of breath and a syncopal episode and was found down by his family.  His echocardiogram revealed preserved LVEF, normal RV size and systolic function. He underwent cardiac catherization which revealed a proximal LAD lesion is 50% stenosed, LVEF 65%, systolic function is normal.  He had been evaluated by cardiology and was placed on a baby aspirin.  He also underwent a VQ scan and was found to have high probability for pulmonary embolism on 10/29/2024.  Bilateral ultrasound of lower extremities was completed on 10/29/2024 and revealed a partially occlusive thrombus extending from the left mid femoral vein through the calf veins with preserved flow through incomplete compressibility.  He was started on subcutaneous Lovenox and transition to Xarelto at discharge.  He is currently  on 15 mg p.o. twice daily dosing for 21 days and will initiate 20 mg dosing next week.  It was believed that his blood clots were provoked by cross-country trip from the end of August through the end of September 2024.  He states that on the way back they drove for significant periods of time with minimal stopping breaks and drove about 1900 miles in 3 days.  He also had his flu and COVID-vaccine on 10/5/2024 and his wife had a COVID-19 infection at the time of his ER visit.  During hospitalization he had a partial hypercoagulable workup sent off which was negative for factor V Leiden, prothrombin gene, lupus anticoagulant, beta-2 glycoprotein antibodies and cardiolipin antibodies.    It is believed during hospitalization that his PE and DVTs were provoked by recent travel and vaccinations.  He was recommended to receive at least 3 months of treatment with Xarelto but up to 6 months total duration of therapy.  Depending on his symptoms it was suggested that he repeat a CT scan about 3 months into his treatment.  He denies any personal history of blood clots or family history of blood clots.  He has no family history of early strokes or MIs.  His mother has history of 5 miscarriages.  Denies any hormonal therapy, supplements or herbal medications.  He is currently taking a baby aspirin in addition to his Xarelto and his cardiologist was inquiring if this was necessary as she does not believe he needs to be on aspirin for cardiac benefits.  He is currently up-to-date on his colonoscopy last one completed 2 to 3 years ago.  His most recent PSA was normal he denies any history of smoking.  He has a history of significant alcohol intake but has not been drinking since his October hospitalization.  Seen Mr. Schwartz today on behalf of Dr. Pallavi Rastogi.          Zaire Schwartz is seen today at the request of William Javier MD for   Encounter Diagnosis   Name Primary?    Acute pulmonary embolism, unspecified pulmonary  "embolism type, unspecified whether acute cor pulmonale present (CMS/HCC) Yes   .         Review of Systems:  Nursing assessment reviewed. Pertinent positive and negative symptoms noted in HPI, all others negative.    Temp:  [36.6 °C (97.9 °F)] 36.6 °C (97.9 °F)  Heart Rate:  [59] 59  BP: (145)/(80) 145/80  Visit Vitals  BP (!) 145/80 (BP Location: Right upper arm, Patient Position: Standing)   Pulse (!) 59   Temp 36.6 °C (97.9 °F)   Ht 1.727 m (5' 8\")   Wt 130 kg (287 lb)   SpO2 96%   BMI 43.64 kg/m²     Physical Exam  Vitals and nursing note reviewed.   Constitutional:       General: He is not in acute distress.     Appearance: Normal appearance. He is not ill-appearing or toxic-appearing.   HENT:      Head: Normocephalic and atraumatic.      Mouth/Throat:      Mouth: Mucous membranes are moist.   Eyes:      General: No scleral icterus.     Extraocular Movements: Extraocular movements intact.      Conjunctiva/sclera: Conjunctivae normal.   Cardiovascular:      Rate and Rhythm: Normal rate and regular rhythm.      Pulses: Normal pulses.      Heart sounds: Normal heart sounds. No murmur heard.  Pulmonary:      Effort: Pulmonary effort is normal. No respiratory distress.      Breath sounds: Normal breath sounds. No stridor. No wheezing, rhonchi or rales.   Abdominal:      General: Bowel sounds are normal. There is no distension.      Palpations: Abdomen is soft. There is no hepatomegaly or splenomegaly.      Tenderness: There is no abdominal tenderness.   Musculoskeletal:         General: Normal range of motion.      Cervical back: Normal range of motion and neck supple. No rigidity.      Right lower leg: No edema.      Left lower leg: Edema (Trace) present.   Skin:     General: Skin is warm and dry.   Neurological:      General: No focal deficit present.      Mental Status: He is alert and oriented to person, place, and time.   Psychiatric:         Mood and Affect: Mood normal.         Behavior: Behavior normal.    "      Thought Content: Thought content normal.         Judgment: Judgment normal.         Past Medical History:   Diagnosis Date    Arthritis     Cancer (CMS/HCC)     Gastric Carcinoid    Deep vein thrombosis (CMS/HCC)     Depression     Hypertension     Lipid disorder     Liver disease        Past Surgical History   Procedure Laterality Date    Fracture surgery      Hernia repair      iFR - initial vessel N/A 10/29/2024    Performed by Dar Huynh MD at  [facility]    LEFT HEART CATH WITH CORONARY ANGIOGRAPHY N/A 10/29/2024    Performed by Dar Huynh MD at  [facility]    Liver biopsy      Skin biopsy      Stomach surgery         OB History   No obstetric history on file.     Gynecologic History:  Age at menarche: No age on file  Age at first live birth: No age on file   Age at menopause: No age on file    Social History     Tobacco Use    Smoking status: Never    Smokeless tobacco: Never   Vaping Use    Vaping status: Never Used   Substance Use Topics    Alcohol use: Not Currently    Drug use: Never       Family History   Problem Relation Name Age of Onset    Heart disease Biological Mother Ynes     Hypertension Biological Mother Ynes     Heart disease Biological Father Manish          Allergies  Patient has no known allergies.    Medications  Current Outpatient Medications   Medication Sig Dispense Refill    amLODIPine (NORVASC) 2.5 mg tablet Take 2.5 mg by mouth daily.      aspirin 81 mg chewable tablet Take 1 tablet (81 mg total) by mouth daily. 30 tablet 0    celecoxib (celeBREX) 100 mg capsule Take 100-200 mg by mouth daily as needed for pain. 1 or 2 capsules      DULoxetine (CYMBALTA) 20 mg capsule Take 40 mg by mouth daily. 2 capsules      ketoconazole (NIZORAL) 2 % cream Apply 1 Application topically 2 (two) times a day as needed for rash.      lisinopriL (PRINIVIL) 40 mg tablet Take 40 mg by mouth daily.      rivaroxaban (XARELTO) 20 mg tablet Take 1 tablet (20 mg  total) by mouth daily with dinner. 90 tablet 1    atorvastatin (LIPITOR) 20 mg tablet Take 20 mg by mouth daily.       No current facility-administered medications for this visit.          Laboratory    Recent Results (from the past 4 weeks)   ECG 12 lead    Collection Time: 10/28/24  7:01 PM   Result Value Ref Range    Ventricular rate 89     Atrial rate 89     FL Interval 160     QRS duration 90     QT Interval 368     QTC Calculation(Bazett) 447     P Axis 27     R Axis 52     T Wave Axis 58    CBC and differential    Collection Time: 10/28/24  7:13 PM   Result Value Ref Range    WBC 7.50 3.80 - 10.50 K/uL    RBC 4.87 4.50 - 5.80 M/uL    Hemoglobin 15.9 13.7 - 17.5 g/dL    Hematocrit 46.5 40.1 - 51.0 %    MCV 95.5 83.0 - 98.0 fL    MCH 32.6 28.0 - 33.2 pg    MCHC 34.2 32.2 - 36.5 g/dL    RDW 12.8 11.6 - 14.4 %    Platelets 157 150 - 350 K/uL    MPV 10.8 9.4 - 12.4 fL    Differential Type Auto     nRBC 0.0 <=0.0 %    Immature Granulocytes 0.1 %    Neutrophils 56.7 %    Lymphocytes 29.7 %    Monocytes 12.1 %    Eosinophils 0.9 %    Basophils 0.5 %    Immature Granulocytes, Absolute 0.01 0.00 - 0.08 K/uL    Neutrophils, Absolute 4.24 1.70 - 7.00 K/uL    Lymphocytes, Absolute 2.23 1.20 - 3.50 K/uL    Monocytes, Absolute 0.91 0.30 - 1.00 K/uL    Eosinophils, Absolute 0.07 0.04 - 0.54 K/uL    Basophils, Absolute 0.04 0.01 - 0.10 K/uL   Comprehensive metabolic panel    Collection Time: 10/28/24  7:13 PM   Result Value Ref Range    Sodium 136 136 - 145 mEQ/L    Potassium 3.8 3.5 - 5.1 mEQ/L    Chloride 103 98 - 107 mEQ/L    CO2 24 21 - 31 mEQ/L    BUN 19 7 - 25 mg/dL    Creatinine 1.0 0.7 - 1.3 mg/dL    Glucose 111 (H) 70 - 99 mg/dL    Calcium 9.7 8.6 - 10.3 mg/dL    AST (SGOT) 23 13 - 39 IU/L    ALT (SGPT) 30 7 - 52 IU/L    Alkaline Phosphatase 53 34 - 125 IU/L    Total Protein 7.6 6.0 - 8.2 g/dL    Albumin 4.4 3.5 - 5.7 g/dL    Bilirubin, Total 0.5 0.3 - 1.2 mg/dL    eGFR >60.0 >=60.0 mL/min/1.73m*2    Anion Gap 9 3 -  15 mEQ/L   HS Troponin I (with 2 hour reflex)    Collection Time: 10/28/24  7:13 PM   Result Value Ref Range    High Sens Troponin I 498.7 (HH) <15.0 pg/mL   HIGH SENSITIVE TROPONIN I (NO REFLEX)    Collection Time: 10/28/24  9:09 PM   Result Value Ref Range    High Sens Troponin I 401.4 (HH) <15.0 pg/mL   ECG 12 lead    Collection Time: 10/28/24  9:27 PM   Result Value Ref Range    Ventricular rate 86     Atrial rate 86     NH Interval 162     QRS duration 86     QT Interval 378     QTC Calculation(Bazett) 452     P Axis 31     R Axis 61     T Wave Axis 60    SARS-COV-2 (COVID-19)/ FLU A/B, AND RSV, PCR Nasopharynx    Collection Time: 10/29/24  3:21 AM    Specimen: Nasopharynx; Nasopharyngeal Swab   Result Value Ref Range    SARS-CoV-2 (COVID-19) Negative Negative    Influenza A Negative Negative    Influenza B Negative Negative    Respiratory Syncytial Virus Negative Negative   Lipid panel    Collection Time: 10/29/24  3:21 AM   Result Value Ref Range    Triglycerides 95 <150 mg/dL    Cholesterol 166 <=200 mg/dL    HDL 43 >=40 mg/dL    LDL Calculated 104 (H) <=100 mg/dL    Non-HDL, Calculated 123 mg/dL    RISK 3.9 <=5.0   Basic metabolic panel    Collection Time: 10/29/24  3:21 AM   Result Value Ref Range    Sodium 138 136 - 145 mEQ/L    Potassium 4.2 3.5 - 5.1 mEQ/L    Chloride 104 98 - 107 mEQ/L    CO2 24 21 - 31 mEQ/L    BUN 16 7 - 25 mg/dL    Creatinine 1.1 0.7 - 1.3 mg/dL    Glucose 114 (H) 70 - 99 mg/dL    Calcium 9.4 8.6 - 10.3 mg/dL    eGFR >60.0 >=60.0 mL/min/1.73m*2    Anion Gap 10 3 - 15 mEQ/L   Magnesium    Collection Time: 10/29/24  3:21 AM   Result Value Ref Range    Magnesium 2.1 1.8 - 2.5 mg/dL   CBC - Stat    Collection Time: 10/29/24  3:21 AM   Result Value Ref Range    WBC 6.62 3.80 - 10.50 K/uL    RBC 4.89 4.50 - 5.80 M/uL    Hemoglobin 15.5 13.7 - 17.5 g/dL    Hematocrit 46.4 40.1 - 51.0 %    MCV 94.9 83.0 - 98.0 fL    MCH 31.7 28.0 - 33.2 pg    MCHC 33.4 32.2 - 36.5 g/dL    RDW 12.8 11.6 -  14.4 %    Platelets 156 150 - 350 K/uL    MPV 10.9 9.4 - 12.4 fL   HIGH SENSITIVE TROPONIN I (BASELINE - REFLEX 2HR)    Collection Time: 10/29/24  8:53 AM   Result Value Ref Range    High Sens Troponin I 214.6 (HH) <15.0 pg/mL   D-DIMER    Collection Time: 10/29/24  8:53 AM   Result Value Ref Range    D-Dimer, Quant >20.00 (H) 0.00 - 0.50 ug/mL FEU   ECG 12 lead    Collection Time: 10/29/24  8:56 AM   Result Value Ref Range    Ventricular rate 75     Atrial rate 75     CO Interval 156     QRS duration 82     QT Interval 408     QTC Calculation(Bazett) 455     P Axis 25     R Axis 59     T Wave Axis 57    Transthoracic echo (TTE) complete    Collection Time: 10/29/24 10:30 AM   Result Value Ref Range    BSA 2.54 m2    Ao Root Annulus 3.70 cm    Ascend Ao 3.90 cm    LVOT Diameter 2D 2.00 cm    MV E' Tissue Velocity Lateral 0.10 m/s    MV E' Tissue Velocity Medial 0.05 m/s    Tissue doppler E/ Lateral E' ratio 4.30     E/E' ratio 8.00     LVOT Cross-section Area 3.14 cm2    E wave deceleration time 433.00 ms    MV Stenosis P1/2t 127.00 ms    MV Peak A-Wave 0.64 m/s    MV Peak E-Wave 0.42 m/s    MVA P1/2  1.73 cm2    E/A ratio 0.70     S' lat velocity 0.11 m/s    TAPSE 1.93 cm    TR Peak Velocity 2.19 m/s    TR Peak Gradient 19.18 mmHg    HR 76 bpm    RAP 3 mmHg    Est RVSP TR JET 22 mmHg   Cardiac catheterization    Collection Time: 10/29/24 11:31 AM   Result Value Ref Range    Cath EF Estimated 65 %   SARS-CoV-2 (COVID-19), PCR Nasopharynx    Collection Time: 10/29/24  5:21 PM    Specimen: Nasopharynx; Nasopharyngeal Swab   Result Value Ref Range    SARS-CoV-2 (COVID-19) Negative Negative   ECG 12 lead    Collection Time: 10/30/24  2:27 PM   Result Value Ref Range    Ventricular rate 65     Atrial rate 65     CO Interval 150     QRS duration 78     QT Interval 422     QTC Calculation(Bazett) 438     P Axis 75     R Axis 62     T Wave Axis 72    Factor 5 Leiden    Collection Time: 10/30/24  4:02 PM   Result Value Ref  Range    Factor V Leiden Normal Normal   Prothrombin gene mutation    Collection Time: 10/30/24  4:02 PM   Result Value Ref Range    Prothrombin Gene Mutation Normal Normal   Beta-2 glycoprotein antibodies    Collection Time: 10/30/24  4:02 PM   Result Value Ref Range    Beta-2 Glyco 1 IgG <9.4 <=20.0 SGU    Beta-2 Glyco 1 IgA <9.4 <=20.0 JANI    Beta 2 Glyco 1 IgM <9.4 <=20.0 SMU   Cardiolipin antibody    Collection Time: 10/30/24  4:02 PM   Result Value Ref Range    Anticardiolipin IgG <9.4 <=20.0 GPL U/mL    Anticardiolipin IgM <9.4 <=20.0 MPL U/mL    Anticardiolipin IgA <9.4 <=20.0 APL U/mL   Lupus Anticoagulant Interpretation    Collection Time: 10/30/24  4:02 PM   Result Value Ref Range    Lupus Anticoagulant Interpretation       PT, PTT and DRVVT are within the reference range. The presence of a lupus anticoagulant is excluded.    Electronically signed by Marisela Gutierrez MD on November 6, 2024 at 7:49 PM.   Lupus Anticoagulant    Collection Time: 10/30/24  4:02 PM   Result Value Ref Range    PT 14.5 12.2 - 14.5 sec    Dilute Viper Venom Time 41 31 - 44 sec    PTT-LA 37 31 - 44 sec         Pathology  Pathology Results       ** No results found for the last 720 hours. **            Radiology    X-RAY CHEST 2 VIEWS    Result Date: 10/30/2024  Narrative: CLINICAL HISTORY: VQ scan TECHNIQUE: Frontal and lateral views of the chest were obtained COMPARISON: PET scan dated 10/20/2024, chest x-ray dated 10/20/2024 COMMENT: The lungs are clear without infiltrate, effusion or pneumothorax. The cardiomediastinal silhouette is within normal limits. Degenerative changes in the spine.     Impression: IMPRESSION: No active disease in the chest.    NUCLEAR MEDICINE LUNG VENTILATION PERFUSION AEROSOL    Addendum Date: 10/29/2024 Addendum:   Dr JACKSON Murphy in[place] [facility] [place] at 5:27pm on 10/29/2024 and advised radiology results are now available for viewing.    Result Date: 10/29/2024  Narrative: CLINICAL HISTORY: Positive d-dimer.  COMPARISON: Lower extremity venous duplex sonography 10/29/2024. Chest x-ray 10/28/2024. Pulmonary perfusion and ventilation scintigraphic imaging was performed utilizing 38.4 mCi of Tc99m DTPA aerosol and 4.4 mCi of Tc99m MAA.  Ventilation and perfusion images were acquired in multiple projections. COMMENT: There is a large mismatched defect in the right midlung. There is a small to moderate mismatched defect in the left midlung.     Impression: IMPRESSION: High probability for pulmonary embolism. Further evaluation with CTA is suggested if possible.    Ultrasound venous leg bilateral    Result Date: 10/29/2024  Narrative: CLINICAL HISTORY: New onset left lower extremity swelling COMPARISON: None. COMMENT: Technique: Grayscale, Color Doppler, and Spectral Doppler (Duplex) ultrasound of both lower extremities was performed, utilizing compression. LEFT Leg: Common femoral vein - Normally compressible with spontaneous phasic flow. No evidence of thrombus. Femoral vein - suspected partially occlusive thrombus involving the mid femoral through the calf veins with preserved flow. Deep femoral vein - Normally compressible with spontaneous phasic flow. No evidence of thrombus. Popliteal vein - suspected partially occlusive thrombus with preserved flow though incomplete compressibility. Calf veins - suspected partially occlusive thrombus with preserved flow though incomplete compressibility of the gastrocnemius, poster tibial, and peroneal veins. RIGHT Leg: Common femoral vein - Normally compressible with spontaneous phasic flow. No evidence of thrombus. Femoral vein - Normally compressible with spontaneous phasic flow. No evidence of thrombus. Deep femoral vein - Normally compressible with spontaneous phasic flow. No evidence of thrombus. Popliteal vein - Normally compressible with spontaneous phasic flow. No evidence of thrombus. Calf veins - The gastrocnemius, poster tibial, and peroneal veins are unremarkable..      Impression: IMPRESSION: 1. Suspected partially occlusive thrombus extending from the left mid femoral vein through the calf veins with preserved flow though incomplete compressibility. 2. No evidence of DVT in the right lower extremity.    Transthoracic echo (TTE) complete    Result Date: 10/29/2024  Narrative:   Left Ventricle: Normal ventricle size. Normal wall thickness. Preserved systolic function. Estimated EF 70-75%. Wall motion appears grossly normal. Normal diastolic filling pattern for age.   Right Ventricle: Normal ventricle size. Normal systolic function.   Left Atrium: Normal sized atrium.   Right Atrium: Normal sized atrium.   Aortic Valve: Normal structure. No regurgitation. No stenosis.   Mitral Valve: Normal leaflet structure. Normal leaflet motion. Trace regurgitation. No stenosis.   Tricuspid Valve: Normal structure. Trace regurgitation. Estimated RVSP = 22 mmHg. No significant stenosis.   Pulmonic Valve: Normal structure. Trace regurgitation. No stenosis.   Pericardium: Normal structure. No evidence of pericardial effusion. No cardiac tamponade.   Aorta: Mild dilatation at the sinuses of Valsalva. Mild dilatation of the ascending aorta.   IVC/SVC: Inferior vena cava is <2.1cm. Inferior vena cava collapses >50% during inspiration. Grossly normal LVEF approximately 75% with normal wall motion.     Cardiac catheterization    Result Date: 10/29/2024  Narrative:   Prox LAD lesion is 50% stenosed. IFR = 0.96   Normal LVEDP   Left ventricular ejection fraction is approximately 65%.   Left ventricular systolic function is normal. RECOMMENDATIONS:  1.  Medical therapy     X-RAY CHEST 2 VIEWS    Result Date: 10/28/2024  Narrative: CLINICAL HISTORY: Chest pain.  Shortness of breath. COMMENT: PA and lateral views of the chest were obtained. Comparison: None There is no focal consolidation or pleural effusion identified. The cardiac and mediastinal silhouettes are unremarkable.  Degenerative changes are  noted in the regional osseous structures.     Impression: IMPRESSION: No radiographic evidence of acute cardiopulmonary disease.       Assessment and Plan  Pulmonary embolism (CMS/HCC)  Mr. Zaire Schwartz returns today for initial hematology outpatient consultation regarding provoked DVT and PE likely due to cross-country travel during August and September 2024 and multiple vaccinations with flu and COVID on 10/5/2024.  His hypercoagulable workup has been negative for factor V Leiden, prothrombin gene mutation, lupus anticoagulant, beta-2 glycoprotein and cardiolipin antibodies.  He is currently taking aspirin in addition to his Xarelto.  He denies any signs or symptoms of obvious bleeding or bruising.  His left lower extremity swelling has improved and is no longer short of breath or having chest pain.  I have asked him to stop taking his aspirin as long as his cardiologist approves of this as taking these concurrently can cause increased bleeding and risk for GI ulcers.    I have sent a refill to his pharmacy today to [facility] for Xarelto.  Tentative plan is for him to complete at least 3 months of Xarelto therapy, he is going to have repeat labs at the end of January for another provider and at that time we will also repeat a BMP and a D-dimer.  The BMP will evaluate his kidney function in the event that we need to do a repeat CTA of the chest.  If his D-dimer is positive, will proceed with CTA of the chest as well.  Depending on the results of the CT of the chest may need to continue therapy for 6 months total duration of therapy.  Ample amount of time was provided for patient and his wifes questions.  He was encouraged to reach out with any concerns or questions in the interim.  Tentative plan is to see him back in mid February 2025.       FRANDY Dorman, MSN  [facility]

## 2024-11-15 NOTE — PROGRESS NOTES
Review of Systems   Constitutional: Negative.    HENT:  Negative.     Eyes: Negative.    Respiratory: Negative.     Cardiovascular: Negative.    Gastrointestinal: Negative.    Endocrine: Negative.    Genitourinary: Negative.     Musculoskeletal:  Positive for arthralgias (whole body) and back pain.   Skin: Negative.    Neurological:  Positive for headaches and light-headedness.   Hematological: Negative.    Psychiatric/Behavioral:  The patient is nervous/anxious.

## 2024-11-21 ENCOUNTER — TELEPHONE (OUTPATIENT)
Dept: HEMATOLOGY/ONCOLOGY | Facility: CLINIC | Age: 64
End: 2024-11-21
Payer: COMMERCIAL

## 2024-11-21 NOTE — TELEPHONE ENCOUNTER
Pt called, states he has a diease where he gets growths on the bottom of his feet. He is prescribed topical verapamil and wants to know if this is ok to take while on Xarelto.

## 2024-11-21 NOTE — TELEPHONE ENCOUNTER
Spoke to patient at this time regarding lack of info on topical verapamil and xarelto.  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  Per pt, his dermatologist, pharmacist, and pulmonologist agree that it most likely should be okay to take the drugs together.  Educated pt if he does use the topical verapamil, to be extra cautious of s/s bleeding such as bruising easily. Pt will keep us updated if he as any complications while starting the topical verapamil, but ultimately wants to use it since treatment for plantar fibromatosis is limited.

## 2025-01-09 ENCOUNTER — IOP CHECK (OUTPATIENT)
Dept: URBAN - METROPOLITAN AREA CLINIC 79 | Facility: CLINIC | Age: 65
End: 2025-01-09

## 2025-01-09 DIAGNOSIS — H40.013: ICD-10-CM

## 2025-01-09 DIAGNOSIS — H04.123: ICD-10-CM

## 2025-01-09 DIAGNOSIS — H25.13: ICD-10-CM

## 2025-01-09 PROCEDURE — 92012 INTRM OPH EXAM EST PATIENT: CPT | Mod: 25

## 2025-01-09 PROCEDURE — 92020 GONIOSCOPY: CPT | Mod: NC

## 2025-01-09 PROCEDURE — 65855 TRABECULOPLASTY LASER SURG: CPT

## 2025-01-09 PROCEDURE — 92250 FUNDUS PHOTOGRAPHY W/I&R: CPT

## 2025-01-09 ASSESSMENT — TONOMETRY
OS_IOP_MMHG: 22
OS_IOP_MMHG: 22
OD_IOP_MMHG: 20
OD_IOP_MMHG: 20

## 2025-01-09 ASSESSMENT — VISUAL ACUITY
OS_CC: 20/30
OD_CC: 20/25-1

## 2025-01-16 ENCOUNTER — POST-OP CHECK (OUTPATIENT)
Dept: URBAN - METROPOLITAN AREA CLINIC 79 | Facility: CLINIC | Age: 65
End: 2025-01-16

## 2025-01-16 DIAGNOSIS — H57.12: ICD-10-CM

## 2025-01-16 DIAGNOSIS — H40.013: ICD-10-CM

## 2025-01-16 DIAGNOSIS — H04.123: ICD-10-CM

## 2025-01-16 PROCEDURE — 99024 POSTOP FOLLOW-UP VISIT: CPT

## 2025-01-16 ASSESSMENT — VISUAL ACUITY: OS_CC: 20/25

## 2025-01-16 ASSESSMENT — TONOMETRY
OS_IOP_MMHG: 19
OS_IOP_MMHG: 19

## 2025-02-04 ENCOUNTER — TRANSCRIBE ORDERS (OUTPATIENT)
Dept: SCHEDULING | Age: 65
End: 2025-02-04

## 2025-02-04 DIAGNOSIS — M72.2 PLANTAR FASCIAL FIBROMATOSIS: Primary | ICD-10-CM

## 2025-02-05 ENCOUNTER — IOP CHECK (OUTPATIENT)
Dept: URBAN - METROPOLITAN AREA CLINIC 79 | Facility: CLINIC | Age: 65
End: 2025-02-05

## 2025-02-05 DIAGNOSIS — H40.013: ICD-10-CM

## 2025-02-05 PROCEDURE — 92012 INTRM OPH EXAM EST PATIENT: CPT

## 2025-02-05 ASSESSMENT — VISUAL ACUITY
OS_CC: 20/25
OD_PH: 20/30
OD_CC: 20/40-1

## 2025-02-05 ASSESSMENT — TONOMETRY
OD_IOP_MMHG: 18
OS_IOP_MMHG: 16
OD_IOP_MMHG: 16
OS_IOP_MMHG: 23

## 2025-02-06 LAB — D DIMER PPP FEU-MCNC: <0.2 MG/L FEU (ref 0–0.49)

## 2025-02-21 ENCOUNTER — OFFICE VISIT (OUTPATIENT)
Dept: HEMATOLOGY/ONCOLOGY | Facility: CLINIC | Age: 65
End: 2025-02-21
Payer: COMMERCIAL

## 2025-02-21 VITALS
SYSTOLIC BLOOD PRESSURE: 130 MMHG | OXYGEN SATURATION: 100 % | TEMPERATURE: 96.9 F | HEART RATE: 70 BPM | BODY MASS INDEX: 43.33 KG/M2 | WEIGHT: 285 LBS | DIASTOLIC BLOOD PRESSURE: 74 MMHG

## 2025-02-21 DIAGNOSIS — I26.99 ACUTE PULMONARY EMBOLISM, UNSPECIFIED PULMONARY EMBOLISM TYPE, UNSPECIFIED WHETHER ACUTE COR PULMONALE PRESENT (CMS/HCC): ICD-10-CM

## 2025-02-21 DIAGNOSIS — D3A.092 CARCINOID TUMOR OF STOMACH, UNSPECIFIED WHETHER MALIGNANT: Primary | ICD-10-CM

## 2025-02-21 DIAGNOSIS — I82.412 ACUTE DEEP VEIN THROMBOSIS (DVT) OF FEMORAL VEIN OF LEFT LOWER EXTREMITY (CMS/HCC): ICD-10-CM

## 2025-02-21 PROCEDURE — 99213 OFFICE O/P EST LOW 20 MIN: CPT

## 2025-02-21 PROCEDURE — 3008F BODY MASS INDEX DOCD: CPT

## 2025-02-21 RX ORDER — CLONAZEPAM 0.5 MG/1
1 TABLET ORAL DAILY PRN
COMMUNITY
Start: 2025-02-14

## 2025-02-21 ASSESSMENT — ENCOUNTER SYMPTOMS
RESPIRATORY NEGATIVE: 1
HEMATOLOGIC/LYMPHATIC NEGATIVE: 1
GASTROINTESTINAL NEGATIVE: 1
CONSTITUTIONAL NEGATIVE: 1
MUSCULOSKELETAL NEGATIVE: 1
CARDIOVASCULAR NEGATIVE: 1
PSYCHIATRIC NEGATIVE: 1
NEUROLOGICAL NEGATIVE: 1
ENDOCRINE NEGATIVE: 1
EYES NEGATIVE: 1

## 2025-02-21 ASSESSMENT — PAIN SCALES - GENERAL: PAINLEVEL_OUTOF10: 0-NO PAIN

## 2025-02-21 NOTE — LETTER
February 21, 2025    Patient: Manish Schwartz   YOB: 1960   Date of Visit: 2/21/2025       Dear Dr. Javier:    The patient is seen back at the [facility] today in follow up with regard to his   1. Carcinoid tumor of stomach, unspecified whether malignant    2. Acute pulmonary embolism, unspecified pulmonary embolism type, unspecified whether acute cor pulmonale present (CMS/HCC)    3. Acute deep vein thrombosis (DVT) of femoral vein of left lower extremity (CMS/HCC)    . Attached is my assessment and plan of care.         Sincerely,        FRANDY Dorman    CC: No Recipients

## 2025-02-21 NOTE — ASSESSMENT & PLAN NOTE
Mr. Zaire Schwartz returns today in follow-up regarding history of likely provoked DVT and PE due to cross-country travel during August and September 2024, multiple vaccinations for flu and COVID-19 on 10/5/2024.  Hypercoagulable workup negative for factor V Leiden, prothrombin gene mutation, lupus anticoagulant, beta-2 glycoprotein and cardiolipin antibodies.  He continues on Xarelto 20 mg p.o. daily and is no longer taking baby aspirin.  He is not having any signs or symptoms at this time suggest recurrence of DVT or PE.  Since last office visit he underwent repeat D-dimer testing on 2/6/2025 which was less than 0.2 (greater than 20 at initial diagnosis).  Our plan was that he still had an elevated D-dimer to repeat CTA of the chest, but as his D-dimer was no longer elevated we decided to hold off on CTA of the chest.  He did seek second opinion with pulmonology at [place], Dr. Clayton, who recommended he remain on indefinite anticoagulation given high risk of recurrence and he believed his DVT/PE is to be unprovoked.    At this time as he has been on therapy for almost 4 months, we will transition to a preventative dose.  We discussed the risk of recurrence for a DVT and PE is the highest in the first year.  We reviewed in detail risk of recurrence including: For first episode of an unprovoked VTE the risk of recurrence is 10% first year and annually 5% after that, for first VTE provoked by nonsurgical risk factor the risk is 5% in the first year and 2.5% annually after that.  As patient reports that he is frequently taking long car rides and concerned about risk of developing subsequent PE and DVT he is interested in indefinite anticoagulation at the preventative dosing, but would like to repeat a D-dimer in 3 months from now for continued surveillance.  I will contact him via telephone regarding results of D-dimer once available.  In the interim I sent a prescription for preventative dosing of Xarelto at 10 mg p.o.  daily to [facility] for him to start taking as he only has 1 more week or so left of his 20 mg Xarelto dosing.  All concerns and questions were answered to his satisfaction.  He will reach out with any concerns or questions in the interim.  As he is traveling down to [place] next week I have encouraged him to take frequent rest stops on his drive down there, walk and really move his muscles during rest breaks, consider compression stockings at 20 to 30 mm of pressure and hydrate well on his trip.

## 2025-02-21 NOTE — PROGRESS NOTES
Zaire Schwartz is a 64 y.o. male,   :  1960    Encounter Diagnoses   Name Primary?    Acute pulmonary embolism, unspecified pulmonary embolism type, unspecified whether acute cor pulmonale present (CMS/HCC)     Carcinoid tumor of stomach, unspecified whether malignant Yes    Acute deep vein thrombosis (DVT) of femoral vein of left lower extremity (CMS/HCC)        Patient Active Problem List   Diagnosis    Elevated troponin    Hypertension    Lipid disorder    Anxiety    Carcinoid tumor of stomach    Syncope    Pulmonary embolism (CMS/HCC)    Acute deep vein thrombosis (DVT) of femoral vein of left lower extremity (CMS/HCC)       History of Present Illness  Mr. Zaire Schwartz returns today in follow up regarding recently diagnosed pulmonary embolism and left lower extremity DVT on 10/29/2024. He was seen for initial hematology consultation by Dr. Pallavi Rastogi on 10/30/2024 for management recommendations.  He has a past medical history of hypertension, hyperlipidemia, gastric carcinoid tumor in  which has been resected and is in remission continues with serial EGDs (last ), and morbid obesity.  He presented to the emergency department with chest pain, shortness of breath and a syncopal episode and was found down by his family.  His echocardiogram revealed preserved LVEF, normal RV size and systolic function. He underwent cardiac catherization which revealed a proximal LAD lesion is 50% stenosed, LVEF 65%, systolic function is normal.  He had been evaluated by cardiology and was placed on a baby aspirin.  He also underwent a VQ scan and was found to have high probability for pulmonary embolism on 10/29/2024.  Bilateral ultrasound of lower extremities was completed on 10/29/2024 and revealed a partially occlusive thrombus extending from the left mid femoral vein through the calf veins with preserved flow through incomplete compressibility.  He was started on subcutaneous Lovenox and transition to  Xarelto at discharge.  He has been taking xarelto 20 mg a day since completing loading dose, now on therapy for about 4 months.  It was believed that his blood clots were provoked by cross-country trip from the end of August through the end of September 2024.  He states that on the way back they drove for significant periods of time with minimal stopping breaks and drove about 1900 miles in 3 days.  He also had his flu and COVID-vaccine on 10/5/2024 and his wife had a COVID-19 infection at the time of his ER visit.  During hospitalization he had a partial hypercoagulable workup sent off which was negative for factor V Leiden, prothrombin gene, lupus anticoagulant, beta-2 glycoprotein antibodies and cardiolipin antibodies.     Since his last office visit he has stopped taking aspirin in addition to his Xarelto.  He denies any signs or symptoms of bleeding while on Xarelto therapy.  He has occasional short lived chest pain, but no significant chest pain or shortness of breath, he did seek second opinion with Dr. Clayton of pulmonology at [place] regarding pulmonary embolisms and he believed patient had an unprovoked DVT and should remain on indefinite anticoagulation.  Dr. Clayton also thought it would be expected for him to have intermittent chest pain from time to time following PEs.  Our plan at his last office visit was to repeat a D-dimer 3 months after diagnosis which she had completed on 2/6/2025 which was less than 0.2.  Initially at diagnosis his D-dimer was greater than 20.  He reports that he takes numerous long car rides every year and would not be opposed to being on indefinite anticoagulation.  He had his last EGD in 2021 for surveillance of his gastric carcinoid tumor, recommended to repeat in 3 to 5 years.  He has been trying to lose weight by being more active.  Has lost about 12 pounds since October 2024.        Review of Systems - Oncology    Review of Systems:    Constitutional: Negative.    HENT:   Negative.     Eyes: Negative.    Respiratory: Negative.     Cardiovascular: Negative.    Gastrointestinal: Negative.    Endocrine: Negative.    Genitourinary: Negative.     Musculoskeletal: Negative.    Skin: Negative.    Neurological: Negative.    Hematological: Negative.    Psychiatric/Behavioral: Negative.     Nursing assessment reviewed. Pertinent positive and negative symptoms noted in HPI, all others negative.      Temp:  [36.1 °C (96.9 °F)] 36.1 °C (96.9 °F)  Heart Rate:  [70] 70  BP: (130-150)/(74-84) 130/74  Visit Vitals  /74 (BP Location: Right upper arm, Patient Position: Sitting)   Pulse 70   Temp (!) 36.1 °C (96.9 °F) (Temporal)   Wt 129 kg (285 lb)   SpO2 100%   BMI 43.33 kg/m²     Physical Exam  Vitals and nursing note reviewed.   Constitutional:       General: He is not in acute distress.     Appearance: Normal appearance. He is obese. He is not ill-appearing or toxic-appearing.   HENT:      Head: Normocephalic and atraumatic.      Mouth/Throat:      Mouth: Mucous membranes are moist.   Eyes:      General: No scleral icterus.     Extraocular Movements: Extraocular movements intact.      Conjunctiva/sclera: Conjunctivae normal.   Cardiovascular:      Rate and Rhythm: Normal rate.      Pulses: Normal pulses.      Heart sounds: Normal heart sounds.   Pulmonary:      Effort: Pulmonary effort is normal. No respiratory distress.      Breath sounds: Normal breath sounds. No stridor. No wheezing, rhonchi or rales.   Abdominal:      General: Bowel sounds are normal. There is no distension.      Palpations: Abdomen is soft. There is no hepatomegaly or splenomegaly.      Tenderness: There is no abdominal tenderness.   Musculoskeletal:         General: Normal range of motion.      Cervical back: Normal range of motion and neck supple. No rigidity.      Right lower leg: Edema present.      Left lower leg: Edema present.   Skin:     General: Skin is warm and dry.   Neurological:      General: No focal  deficit present.      Mental Status: He is alert and oriented to person, place, and time.   Psychiatric:         Mood and Affect: Mood normal.         Behavior: Behavior normal.         Thought Content: Thought content normal.         Judgment: Judgment normal.         Past Medical History[1]    Past Surgical History   Procedure Laterality Date    Fracture surgery      Hernia repair      iFR - initial vessel N/A 10/29/2024    Performed by Dar Huynh MD at  [facility]    LEFT HEART CATH WITH CORONARY ANGIOGRAPHY N/A 10/29/2024    Performed by Dar Huynh MD at  [facility]    Liver biopsy      Skin biopsy      Stomach surgery         Social History     Tobacco Use    Smoking status: Never    Smokeless tobacco: Never   Vaping Use    Vaping status: Never Used   Substance Use Topics    Alcohol use: Not Currently    Drug use: Never       Family History   Problem Relation Name Age of Onset    Heart disease Biological Mother Ynes     Hypertension Biological Mother Ynes     Heart disease Biological Father Manish          Allergies  Patient has no known allergies.    Medications    Current Outpatient Medications:     amLODIPine (NORVASC) 2.5 mg tablet, Take 2.5 mg by mouth daily., Disp: , Rfl:     atorvastatin (LIPITOR) 20 mg tablet, Take 20 mg by mouth daily., Disp: , Rfl:     celecoxib (celeBREX) 100 mg capsule, Take 100-200 mg by mouth daily as needed for pain. 1 or 2 capsules, Disp: , Rfl:     clonazePAM (klonoPIN) 0.5 mg tablet, Take 1 tablet by mouth daily as needed., Disp: , Rfl:     DULoxetine (CYMBALTA) 20 mg capsule, Take 40 mg by mouth daily. 2 capsules, Disp: , Rfl:     ketoconazole (NIZORAL) 2 % cream, Apply 1 Application topically 2 (two) times a day as needed for rash., Disp: , Rfl:     lisinopriL (PRINIVIL) 40 mg tablet, Take 40 mg by mouth daily., Disp: , Rfl:     NOT IN DATABASE, Apply 1 Application topically 2 (two) times a day. topical verapamil 15% and mixed with  diclofenac gel 3%., Disp: , Rfl:     rivaroxaban (XARELTO) 10 mg tablet, Take 1 tablet (10 mg total) by mouth daily., Disp: 90 tablet, Rfl: 0    aspirin 81 mg chewable tablet, Take 1 tablet (81 mg total) by mouth daily., Disp: 30 tablet, Rfl: 0     Laboratory    Recent Results (from the past 3 weeks)   D-DIMER    Collection Time: 02/05/25  9:07 AM   Result Value Ref Range    D-Dimer <0.20 0.00 - 0.49 mg/L FEU         Radiology  No new Radiology.  TRANSTHORACIC ECHO (TTE) COMPLETE    Result Date: 1/30/2025  Narrative:   A complete transthoracic echocardiogram (including 2D, color flow Doppler, spectral Doppler, M-mode and strain imaging) was performed using the standard protocol. The study quality was adequate.   The left ventricle is normal in size. There is mildly increased left ventricular wall thickness consistent with mild concentric hypertrophy. LV IVS measures 1.1 cm. Endocardium is incompletely visualized, but no regional wall motion abnormalities are noted in the visualized segments. Normal left ventricular ejection fraction. The left ventricular ejection fraction by biplane method of discs is 62%. The average global longitudinal peak systolic strain is borderline. The average global longitudinal peak systolic strain is 17.9% (absolute value).   There is normal LV diastolic function. There is reduced tissue Doppler velocity of the lateral mitral valve annulus.   The right ventricle is not well visualized, probably normal in size and systolic function.   The left atrium is normal in size.   The right atrium is normal in size.   The mitral valve is normal in structure. There is trace mitral regurgitation. There is no mitral stenosis.   Aortic valve not well visualized but probably normal. There is no aortic stenosis. There is no aortic regurgitation.   The tricuspid valve is normal in structure. There is trace tricuspid regurgitation. The pulmonary artery systolic pressure is unable to be estimated due to an  incomplete tricuspid regurgitation envelope.   The aorta measures 3.6 cm at the sinus of Valsalva.   The inferior vena cava is normal in size (diameter <21 mm) and decreases >50% in size with inspiration, suggesting a normal right atrial pressure of 3 mmHg (range 0-5 mm Hg).   Compared to the prior study of 3/5/2024, there are no significant changes. Left Ventricle The left ventricle is normal in size. There is mildly increased left ventricular wall thickness consistent with mild concentric hypertrophy. LV IVS measures 1.1 cm. Endocardium is incompletely visualized, but no regional wall motion abnormalities are noted in the visualized segments. The left ventricular ejection fraction by biplane method of discs is 62%. Normal left ventricular ejection fraction. The average global longitudinal peak systolic strain is borderline. The average global longitudinal peak systolic strain is 17.9% (absolute value). There is normal LV diastolic function. There is reduced tissue Doppler velocity of the lateral mitral valve annulus. Right Ventricle The right ventricle is not well visualized, probably normal in size and systolic function. The tricuspid annular plane systolic excursion (TAPSE) is normal. Right ventricular TAPSE measures 2.1 cm. Left Atrium The left atrium is normal in size.   Right Atrium The right atrium is normal in size. IVC/SVC The inferior vena cava is normal in size (diameter <21 mm) and decreases >50% in size with inspiration, suggesting a normal right atrial pressure of 3 mmHg (range 0-5 mm Hg). Mitral Valve  The mitral valve is normal in structure. There is no mitral stenosis. There is trace mitral regurgitation. Tricuspid Valve The tricuspid valve is normal in structure. There is trace tricuspid regurgitation. The pulmonary artery systolic pressure is unable to be estimated due to an incomplete tricuspid regurgitation envelope. Aortic Valve Aortic valve not well visualized but probably normal. There is no  aortic stenosis. There is no aortic regurgitation. Pulmonic Valve Pulmonic valve is not well visualized, but probably normal. There is trace pulmonic valve regurgitation. Pericardium No pericardial effusion. Septum No obvious interatrial communication noted with color Doppler imaging. Aortic Arch/Descending/Abdominal Aorta The aortic root is normal in size. The aorta measures 3.6 cm at the sinus of Valsalva. The proximal segment of the ascending aorta is normal in size. Study Details A complete transthoracic echocardiogram (including 2D, color flow Doppler, spectral Doppler, M-mode and strain imaging) was performed using the standard protocol. During the study, the following view(s) were acquired: apical, parasternal, subcostal and suprasternal. The study quality was adequate. Nuclear Prior Study Compared to the prior study of 3/5/2024, there are no significant changes.      Assessment and Plan  Problem List Items Addressed This Visit          Circulatory    Pulmonary embolism (CMS/HCC)    Current Assessment & Plan     Mr. Zaire Schwartz returns today in follow-up regarding history of likely provoked DVT and PE due to cross-country travel during August and September 2024, multiple vaccinations for flu and COVID-19 on 10/5/2024.  Hypercoagulable workup negative for factor V Leiden, prothrombin gene mutation, lupus anticoagulant, beta-2 glycoprotein and cardiolipin antibodies.  He continues on Xarelto 20 mg p.o. daily and is no longer taking baby aspirin.  He is not having any signs or symptoms at this time suggest recurrence of DVT or PE.  Since last office visit he underwent repeat D-dimer testing on 2/6/2025 which was less than 0.2 (greater than 20 at initial diagnosis).  Our plan was that he still had an elevated D-dimer to repeat CTA of the chest, but as his D-dimer was no longer elevated we decided to hold off on CTA of the chest.  He did seek second opinion with pulmonology at [place], Dr. Clayton, who recommended  he remain on indefinite anticoagulation given high risk of recurrence and he believed his DVT/PE is to be unprovoked.    At this time as he has been on therapy for almost 4 months, we will transition to a preventative dose.  We discussed the risk of recurrence for a DVT and PE is the highest in the first year.  We reviewed in detail risk of recurrence including: For first episode of an unprovoked VTE the risk of recurrence is 10% first year and annually 5% after that, for first VTE provoked by nonsurgical risk factor the risk is 5% in the first year and 2.5% annually after that.  As patient reports that he is frequently taking long car rides and concerned about risk of developing subsequent PE and DVT he is interested in indefinite anticoagulation at the preventative dosing, but would like to repeat a D-dimer in 3 months from now for continued surveillance.  I will contact him via telephone regarding results of D-dimer once available.  In the interim I sent a prescription for preventative dosing of Xarelto at 10 mg p.o. daily to [facility] for him to start taking as he only has 1 more week or so left of his 20 mg Xarelto dosing.  All concerns and questions were answered to his satisfaction.  He will reach out with any concerns or questions in the interim.  As he is traveling down to [place] next week I have encouraged him to take frequent rest stops on his drive down there, walk and really move his muscles during rest breaks, consider compression stockings at 20 to 30 mm of pressure and hydrate well on his trip.           Relevant Orders    D-DIMER    Acute deep vein thrombosis (DVT) of femoral vein of left lower extremity (CMS/HCC)    Current Assessment & Plan     See plan under pulmonary embolism            Digestive    Carcinoid tumor of stomach - Primary    Current Assessment & Plan     History of gastric carcinoid tumor in 2014, continues with EGD surveillance, last in 2021 recommended to repeat in 3 to 5  years.  No new GI symptoms at this time.            FRANDY Dorman         [1]   Past Medical History:  Diagnosis Date    Arthritis     Cancer (CMS/HCC)     Gastric Carcinoid    Deep vein thrombosis (CMS/HCC)     Depression     Hypertension     Lipid disorder     Liver disease

## 2025-02-21 NOTE — PROGRESS NOTES
Review of Systems   Constitutional: Negative.    HENT:  Negative.     Eyes: Negative.    Respiratory: Negative.     Cardiovascular: Negative.    Gastrointestinal: Negative.    Endocrine: Negative.    Genitourinary: Negative.     Musculoskeletal: Negative.    Skin: Negative.    Neurological: Negative.    Hematological: Negative.    Psychiatric/Behavioral: Negative.

## 2025-02-21 NOTE — ASSESSMENT & PLAN NOTE
History of gastric carcinoid tumor in 2014, continues with EGD surveillance, last in 2021 recommended to repeat in 3 to 5 years.  No new GI symptoms at this time.

## 2025-02-24 NOTE — TELEPHONE ENCOUNTER
Patient is requesting a 3 week supply of Xarelto be sent to the [facility]'s in [place] as he will not get his mail order RX in time before they leave for [place].

## 2025-04-14 ENCOUNTER — APPOINTMENT (OUTPATIENT)
Age: 65
Setting detail: DERMATOLOGY
End: 2025-04-14

## 2025-04-14 DIAGNOSIS — L82.1 OTHER SEBORRHEIC KERATOSIS: ICD-10-CM

## 2025-04-14 DIAGNOSIS — D18.0 HEMANGIOMA: ICD-10-CM

## 2025-04-14 DIAGNOSIS — L57.8 OTHER SKIN CHANGES DUE TO CHRONIC EXPOSURE TO NONIONIZING RADIATION: ICD-10-CM

## 2025-04-14 DIAGNOSIS — L81.4 OTHER MELANIN HYPERPIGMENTATION: ICD-10-CM

## 2025-04-14 DIAGNOSIS — D22 MELANOCYTIC NEVI: ICD-10-CM

## 2025-04-14 PROBLEM — D18.01 HEMANGIOMA OF SKIN AND SUBCUTANEOUS TISSUE: Status: ACTIVE | Noted: 2025-04-14

## 2025-04-14 PROBLEM — D22.61 MELANOCYTIC NEVI OF RIGHT UPPER LIMB, INCLUDING SHOULDER: Status: ACTIVE | Noted: 2025-04-14

## 2025-04-14 PROCEDURE — 99213 OFFICE O/P EST LOW 20 MIN: CPT

## 2025-04-14 PROCEDURE — OTHER SUNSCREEN RECOMMENDATIONS: OTHER

## 2025-04-14 PROCEDURE — OTHER COUNSELING: OTHER

## 2025-04-14 ASSESSMENT — LOCATION SIMPLE DESCRIPTION DERM
LOCATION SIMPLE: RIGHT UPPER BACK
LOCATION SIMPLE: RIGHT UPPER ARM
LOCATION SIMPLE: LEFT LOWER BACK
LOCATION SIMPLE: ABDOMEN

## 2025-04-14 ASSESSMENT — LOCATION ZONE DERM
LOCATION ZONE: ARM
LOCATION ZONE: TRUNK

## 2025-04-14 ASSESSMENT — LOCATION DETAILED DESCRIPTION DERM
LOCATION DETAILED: RIGHT PROXIMAL POSTERIOR UPPER ARM
LOCATION DETAILED: LEFT INFERIOR MEDIAL MIDBACK
LOCATION DETAILED: RIGHT SUPERIOR UPPER BACK
LOCATION DETAILED: RIGHT MEDIAL UPPER BACK
LOCATION DETAILED: EPIGASTRIC SKIN

## 2025-04-16 ENCOUNTER — IOP CHECK (OUTPATIENT)
Dept: URBAN - METROPOLITAN AREA CLINIC 79 | Facility: CLINIC | Age: 65
End: 2025-04-16

## 2025-04-16 DIAGNOSIS — H04.123: ICD-10-CM

## 2025-04-16 DIAGNOSIS — H25.13: ICD-10-CM

## 2025-04-16 DIAGNOSIS — H40.013: ICD-10-CM

## 2025-04-16 PROCEDURE — 92012 INTRM OPH EXAM EST PATIENT: CPT

## 2025-04-16 ASSESSMENT — TONOMETRY
OS_IOP_MMHG: 18
OD_IOP_MMHG: 18

## 2025-04-16 ASSESSMENT — VISUAL ACUITY
OD_PH: 20/30
OD_CC: 20/40-2
OS_CC: 20/25-2

## 2025-04-25 ENCOUNTER — RX ONLY (RX ONLY)
Age: 65
End: 2025-04-25

## 2025-04-25 RX ORDER — CLOTRIMAZOLE AND BETAMETHASONE DIPROPIONATE 10; .5 MG/G; MG/G
CREAM TOPICAL
Qty: 45 | Refills: 1 | Status: ERX | COMMUNITY
Start: 2025-04-25

## 2025-04-30 DIAGNOSIS — I26.99 ACUTE PULMONARY EMBOLISM, UNSPECIFIED PULMONARY EMBOLISM TYPE, UNSPECIFIED WHETHER ACUTE COR PULMONALE PRESENT (CMS/HCC): Primary | ICD-10-CM

## 2025-04-30 DIAGNOSIS — I82.412 ACUTE DEEP VEIN THROMBOSIS (DVT) OF FEMORAL VEIN OF LEFT LOWER EXTREMITY (CMS/HCC): ICD-10-CM

## 2025-08-06 ENCOUNTER — TELEPHONE (OUTPATIENT)
Facility: HOSPITAL | Age: 65
End: 2025-08-06
Payer: COMMERCIAL

## 2025-08-06 ENCOUNTER — PATIENT MESSAGE (OUTPATIENT)
Dept: HEMATOLOGY/ONCOLOGY | Facility: CLINIC | Age: 65
End: 2025-08-06
Payer: COMMERCIAL

## 2025-08-06 ENCOUNTER — HOSPITAL ENCOUNTER (EMERGENCY)
Facility: HOSPITAL | Age: 65
Discharge: HOME | End: 2025-08-06
Attending: EMERGENCY MEDICINE | Admitting: EMERGENCY MEDICINE
Payer: COMMERCIAL

## 2025-08-06 ENCOUNTER — APPOINTMENT (EMERGENCY)
Dept: RADIOLOGY | Facility: HOSPITAL | Age: 65
End: 2025-08-06
Attending: EMERGENCY MEDICINE
Payer: COMMERCIAL

## 2025-08-06 ENCOUNTER — APPOINTMENT (EMERGENCY)
Dept: RADIOLOGY | Facility: HOSPITAL | Age: 65
End: 2025-08-06
Payer: COMMERCIAL

## 2025-08-06 VITALS
SYSTOLIC BLOOD PRESSURE: 131 MMHG | DIASTOLIC BLOOD PRESSURE: 59 MMHG | TEMPERATURE: 97.2 F | HEIGHT: 68 IN | WEIGHT: 295 LBS | HEART RATE: 59 BPM | BODY MASS INDEX: 44.71 KG/M2 | OXYGEN SATURATION: 97 % | RESPIRATION RATE: 16 BRPM

## 2025-08-06 DIAGNOSIS — M79.89 LEFT LEG SWELLING: Primary | ICD-10-CM

## 2025-08-06 DIAGNOSIS — R07.9 CHEST PAIN, UNSPECIFIED TYPE: ICD-10-CM

## 2025-08-06 DIAGNOSIS — I82.412 ACUTE DEEP VEIN THROMBOSIS (DVT) OF FEMORAL VEIN OF LEFT LOWER EXTREMITY (CMS/HCC): Primary | ICD-10-CM

## 2025-08-06 LAB
ALBUMIN SERPL-MCNC: 4.5 G/DL (ref 3.5–5.7)
ALP SERPL-CCNC: 58 IU/L (ref 34–125)
ALT SERPL-CCNC: 24 IU/L (ref 7–52)
ANION GAP SERPL CALC-SCNC: 7 MEQ/L (ref 3–15)
AST SERPL-CCNC: 19 IU/L (ref 13–39)
BASOPHILS # BLD: 0.04 K/UL (ref 0.01–0.1)
BASOPHILS NFR BLD: 0.6 %
BILIRUB SERPL-MCNC: 0.6 MG/DL (ref 0.3–1.2)
BNP SERPL-MCNC: 20 PG/ML
BUN SERPL-MCNC: 20 MG/DL (ref 7–25)
CALCIUM SERPL-MCNC: 9.6 MG/DL (ref 8.6–10.3)
CHLORIDE SERPL-SCNC: 103 MEQ/L (ref 98–107)
CO2 SERPL-SCNC: 27 MEQ/L (ref 21–31)
CREAT SERPL-MCNC: 1 MG/DL (ref 0.7–1.3)
DIFFERENTIAL METHOD BLD: NORMAL
EGFRCR SERPLBLD CKD-EPI 2021: >60 ML/MIN/1.73M*2
EOSINOPHIL # BLD: 0.06 K/UL (ref 0.04–0.54)
EOSINOPHIL NFR BLD: 0.9 %
ERYTHROCYTE [DISTWIDTH] IN BLOOD BY AUTOMATED COUNT: 13.4 % (ref 11.6–14.4)
GLUCOSE SERPL-MCNC: 147 MG/DL (ref 70–99)
HCT VFR BLD AUTO: 43.3 % (ref 40.1–51)
HGB BLD-MCNC: 14.2 G/DL (ref 13.7–17.5)
IMM GRANULOCYTES # BLD AUTO: 0.02 K/UL (ref 0–0.08)
IMM GRANULOCYTES NFR BLD AUTO: 0.3 %
LYMPHOCYTES # BLD: 1.87 K/UL (ref 1.2–3.5)
LYMPHOCYTES NFR BLD: 27.7 %
MCH RBC QN AUTO: 31.4 PG (ref 28–33.2)
MCHC RBC AUTO-ENTMCNC: 32.8 G/DL (ref 32.2–36.5)
MCV RBC AUTO: 95.8 FL (ref 83–98)
MONOCYTES # BLD: 0.78 K/UL (ref 0.3–1)
MONOCYTES NFR BLD: 11.5 %
NEUTROPHILS # BLD: 3.99 K/UL (ref 1.7–7)
NEUTS SEG NFR BLD: 59 %
NRBC BLD-RTO: 0 %
PLATELET # BLD AUTO: 197 K/UL (ref 150–350)
PMV BLD AUTO: 10.8 FL (ref 9.4–12.4)
POTASSIUM SERPL-SCNC: 3.8 MEQ/L (ref 3.5–5.1)
PROT SERPL-MCNC: 7.7 G/DL (ref 6–8.2)
RBC # BLD AUTO: 4.52 M/UL (ref 4.5–5.8)
SODIUM SERPL-SCNC: 137 MEQ/L (ref 136–145)
TROPONIN I SERPL HS-MCNC: 4 PG/ML
TROPONIN I SERPL HS-MCNC: 4.2 PG/ML
WBC # BLD AUTO: 6.76 K/UL (ref 3.8–10.5)

## 2025-08-06 PROCEDURE — 83880 ASSAY OF NATRIURETIC PEPTIDE: CPT

## 2025-08-06 PROCEDURE — 84484 ASSAY OF TROPONIN QUANT: CPT | Mod: 91 | Performed by: EMERGENCY MEDICINE

## 2025-08-06 PROCEDURE — 99284 EMERGENCY DEPT VISIT MOD MDM: CPT | Mod: 25

## 2025-08-06 PROCEDURE — 93005 ELECTROCARDIOGRAM TRACING: CPT

## 2025-08-06 PROCEDURE — 82040 ASSAY OF SERUM ALBUMIN: CPT | Performed by: EMERGENCY MEDICINE

## 2025-08-06 PROCEDURE — 84484 ASSAY OF TROPONIN QUANT: CPT | Performed by: EMERGENCY MEDICINE

## 2025-08-06 PROCEDURE — 93971 EXTREMITY STUDY: CPT | Mod: LT

## 2025-08-06 PROCEDURE — 99283 EMERGENCY DEPT VISIT LOW MDM: CPT | Mod: 25

## 2025-08-06 PROCEDURE — 71046 X-RAY EXAM CHEST 2 VIEWS: CPT

## 2025-08-06 PROCEDURE — 93005 ELECTROCARDIOGRAM TRACING: CPT | Performed by: EMERGENCY MEDICINE

## 2025-08-06 PROCEDURE — 85025 COMPLETE CBC W/AUTO DIFF WBC: CPT

## 2025-08-06 PROCEDURE — 36415 COLL VENOUS BLD VENIPUNCTURE: CPT

## 2025-08-06 PROCEDURE — 85025 COMPLETE CBC W/AUTO DIFF WBC: CPT | Performed by: EMERGENCY MEDICINE

## 2025-08-06 NOTE — TELEPHONE ENCOUNTER
Patient spoke with cardiologist and they recommended he go to ED.  Patient is going to [place] now.

## 2025-08-06 NOTE — TELEPHONE ENCOUNTER
RN reached out to patient.  Has pain in his ankle and shin on the L leg.  Leg is swollen.  It has never really gone down in swelling since he initially had the DVT but now it is more swollen.  He states his leg is red and warm but he has been at the beach.    No SOB but is having some pain in his L chest which he states comes and goes.  He had consistent chest pain for 25 minutes today but then it resolved.    He continues on xarelto 10 mg daily and a baby aspirin.    Patient was instructed to call his cardiologist about his cardiac symptoms.   Ajith Jose Damon, [place]

## 2025-08-06 NOTE — DISCHARGE INSTRUCTIONS
You were evaluated today in the emergency department for leg swelling and chest pain.  Your ultrasound was negative for blood clot, follow-up with your primary care doctor for a repeat ultrasound in 1 week.    Keep your left leg elevated above heart level as often as possible.  For pain, use over-the-counter Tylenol 500 mg every 4-6 hours, do not exceed 3,000 mg/day.    Follow-up with your primary care doctor within 1 week for re-evaluation.  Return to the emergency department immediately for new/worsening symptoms.

## 2025-08-07 LAB
ATRIAL RATE: 70
P AXIS: 28
PR INTERVAL: 168
QRS DURATION: 88
QT INTERVAL: 396
QTC CALCULATION(BAZETT): 427
R AXIS: 40
T WAVE AXIS: 39
VENTRICULAR RATE: 70

## 2025-08-07 NOTE — ED PROVIDER NOTES
Chest Pain  Associated symptoms: no abdominal pain, no cough, no fever, no numbness, no palpitations, no shortness of breath and no weakness        Chief Complaint   Patient presents with    Chest Pain        HPI   Patient is a 65 year old male with history of DVT/PE on Xarelto, hypertension, hyperlipidemia, gastric carcinoid cancer with past medical history as below presenting to the emergent department for evaluation of left leg swelling and left sided chest pain x 3 weeks. Contrary to triage note, patient noticed increased swelling around his left ankle and intermittent episodes of chest pain over the last few weeks. Unsure which symptom began first. Chest pains come in episodes of a few seconds of sharp pain that resolve spontaneously, patient describe most episodes occurring during rest such as driving. No known trigger, non-exertional/non-pleuritic. Patient's leg swelling has worsened over the last few weeks, no overlying skin change/wound. No fever/chills, loss of sensation, limited ROM.Patient has not tried compression/elevation. Sent by PCP/cardiology for concerns of DVT/PE, patient has been compliant with Xarelto.      Past Medical History:   Diagnosis Date    Arthritis     Cancer (CMS/HCC)     Gastric Carcinoid    Deep vein thrombosis (CMS/HCC)     Depression     Hypertension     Lipid disorder     Liver disease          Past Surgical History   Procedure Laterality Date    Fracture surgery      Hernia repair      iFR - initial vessel N/A 10/29/2024    Performed by Dar Huynh MD at  [facility]    LEFT HEART CATH WITH CORONARY ANGIOGRAPHY N/A 10/29/2024    Performed by Dar Huynh MD at  [facility]    Liver biopsy      Skin biopsy      Stomach surgery         Family History   Problem Relation Name Age of Onset    Heart disease Biological Mother Ynes     Hypertension Biological Mother Ynes     Heart disease Biological Father Manish        Social History  "    Tobacco Use    Smoking status: Never    Smokeless tobacco: Never   Vaping Use    Vaping status: Never Used   Substance Use Topics    Alcohol use: Not Currently    Drug use: Never       Systems Reviewed from Nursing Triage:   Tobacco  Allergies  Meds  Problems  Med Hx  Surg Hx  Fam Hx  Soc   Hx             Review of Systems   Constitutional:  Negative for chills and fever.   Respiratory:  Negative for cough and shortness of breath.    Cardiovascular:  Positive for chest pain and leg swelling. Negative for palpitations.   Gastrointestinal:  Negative for abdominal pain.   Musculoskeletal:  Negative for arthralgias.   Skin:  Negative for color change, rash and wound.   Neurological:  Negative for weakness and numbness.            ED Triage Vitals [08/06/25 1508]   Temp Heart Rate Resp BP SpO2   36.4 °C (97.6 °F) 65 16 (!) 168/72 95 %      Temp Source Heart Rate Source Patient Position BP Location FiO2 (%) (Set)   Temporal -- -- -- --       Vitals:    08/06/25 1508 08/06/25 1816 08/06/25 2012   BP: (!) 168/72 137/62 (!) 131/59   Pulse: 65 (!) 55 (!) 59   Resp: 16 18 16   Temp: 36.4 °C (97.6 °F)  36.2 °C (97.2 °F)   TempSrc: Temporal     SpO2: 95% 97% 97%   Weight: 134 kg (295 lb)     Height: 1.727 m (5' 8\")         Pulse Ox %: 95 % (08/06/25 1549)  Pulse Ox Interpretation: Normal (08/06/25 1549)  Heart Rate: 65 (08/06/25 1549)  Rhythm Strip Interpretation: Normal Sinus Rhythm (08/06/25 1549)        Physical Exam  Vitals and nursing note reviewed.   Constitutional:       General: He is not in acute distress.     Appearance: He is obese. He is not toxic-appearing.   HENT:      Head: Normocephalic and atraumatic. No contusion.      Nose: Nose normal.   Eyes:      General: Gaze aligned appropriately.      Conjunctiva/sclera: Conjunctivae normal.   Cardiovascular:      Rate and Rhythm: Normal rate and regular rhythm.      Pulses:           Radial pulses are 2+ on the right side and 2+ on the left side.        " Posterior tibial pulses are 2+ on the right side and 2+ on the left side.      Heart sounds: Normal heart sounds.      Comments: Trace pitting edema of LLE from ankle to midshin. No erythema/wounds.  Pulmonary:      Effort: Pulmonary effort is normal. No accessory muscle usage or respiratory distress.      Breath sounds: Normal breath sounds and air entry.   Chest:      Chest wall: No tenderness.   Abdominal:      General: Abdomen is flat.      Palpations: Abdomen is soft.      Tenderness: There is no abdominal tenderness.   Musculoskeletal:      Cervical back: Normal range of motion.      Left knee: Normal.      Right lower leg: No edema.      Left lower le+ Pitting Edema present.      Left ankle: Swelling present. Normal range of motion.   Skin:     General: Skin is warm and dry.      Findings: No rash.   Neurological:      Mental Status: He is alert. Mental status is at baseline.      Sensory: No sensory deficit.      Motor: No weakness.      Gait: Gait is intact.   Psychiatric:         Speech: Speech normal.         Behavior: Behavior normal. Behavior is cooperative.         Cognition and Memory: Cognition normal.              US venous leg, LL extremity   Final Result   IMPRESSION:   No evidence of DVT in the left lower extremity.      /      X-RAY CHEST 2 VIEWS   Final Result   IMPRESSION:      No acute pulmonary process.         /      ECG 12 lead             Labs Reviewed   COMPREHENSIVE METABOLIC PANEL - Abnormal       Result Value    Sodium 137      Potassium 3.8      Chloride 103      CO2 27      BUN 20      Creatinine 1.0      Glucose 147 (*)     Calcium 9.6      AST (SGOT) 19      ALT (SGPT) 24      Alkaline Phosphatase 58      Total Protein 7.7      Albumin 4.5      Bilirubin, Total 0.6      eGFR >60.0      Anion Gap 7     HIGH SENSITIVE TROPONIN I (BASELINE - REFLEX 2HR) - Normal    High Sens Troponin I 4.0     HIGH SENSITIVE TROPONIN I (NO REFLEX) - Normal    High Sens Troponin I 4.2     B-TYPE  NATRIURETIC PEPTIDE - Normal    BNP 20     CBC AND DIFF    WBC 6.76      RBC 4.52      Hemoglobin 14.2      Hematocrit 43.3      MCV 95.8      MCH 31.4      MCHC 32.8      RDW 13.4      Platelets 197      MPV 10.8      Differential Type Auto      nRBC 0.0      Immature Granulocytes 0.3      Neutrophils 59.0      Lymphocytes 27.7      Monocytes 11.5      Eosinophils 0.9      Basophils 0.6      Immature Granulocytes, Absolute 0.02      Neutrophils, Absolute 3.99      Lymphocytes, Absolute 1.87      Monocytes, Absolute 0.78      Eosinophils, Absolute 0.06      Basophils, Absolute 0.04     RAINBOW DRAW PANEL    Narrative:     The following orders were created for panel order [facility] Draw Panel.  Procedure                               Abnormality         Status                     ---------                               -----------         ------                     RAINBOW LT BLUE[[id number]]                                  In process                   Please view results for these tests on the individual orders.   RAINBOW LT BLUE       US venous leg, LL extremity   Final Result   IMPRESSION:   No evidence of DVT in the left lower extremity.      /      X-RAY CHEST 2 VIEWS   Final Result   IMPRESSION:      No acute pulmonary process.         /      ECG 12 lead               Procedures    Final diagnoses:   [M79.89] Left leg swelling   [R07.9] Chest pain, unspecified type       ED Course & MDM     Vital Signs Review: Vital signs have been reviewed. The oxygen saturation is  SpO2: 95 % which is  normal.    ED Course as of 08/07/25 1054   Wed Aug 06, 2025   1617 ECG 12 lead  Normal sinus rhythm with sinus arrhythmia  Nonspecific ST abnormality  Abnormal ECG  When compared with ECG of 30-OCT-2024 14:27,  No significant change was found [MJ]   1617 High Sens Troponin I: 4.0 [MJ]   1617 WBC: 6.76 [MJ]   1626 Hemoglobin: 14.2 [MJ]   1648 BNP: 20 [MJ]   1752 X-RAY CHEST 2 VIEWS  IMPRESSION:     No acute pulmonary process. [MJ]    1836 High Sens Troponin I: 4.2 [MJ]   2011  venous leg, LL extremity  IMPRESSION:  No evidence of DVT in the left lower extremity. [MJ]   2011 Patient comfortable and agreeable to discharge with cardiology follow-up. No new/worsening symptoms. No chest pain while in ED. Return precautions advised and discharge instructions explained - elevation strongly encouraged.  [MJ]      ED Course User Index  [MJ] Kyung aMrtin PA C           Medical Decision Making  Amount and/or Complexity of Data Reviewed  Labs: ordered. Decision-making details documented in ED Course.  Radiology: ordered. Decision-making details documented in ED Course.  ECG/medicine tests: ordered. Decision-making details documented in ED Course.        10:54 AM        SARAH Linder  8/7/2025      We are in a period of time with increased volumes and decreased capacity.     This document was created using dragon dictation software.  There might be some typographical errors due to this technology.       Kyung Martin PA C  08/07/25 [id number]

## 2025-08-08 NOTE — ED ATTESTATION NOTE
I have personally seen and examined Manish Schwartz.  I was involved in the care and medical decision making for this patient.    I reviewed and agree with physician assistant / nurse practitioner’s assessment and plan of care; any exceptions are documented below.      My focused history, examination, assessment and plan of care of Manish Schwartz is as follows:    Brief History:  HPI  65-year-old male with history of VTE on Xarelto, hypertension, hyperlipidemia presents to the emergency department for evaluation of mild increased swelling in his left lower extremity around his ankle over the past several days as well as intermittent, brief short-lived chest pain in his left upper chest.  Symptoms are not associated with exertion.  He has been able to bike 5 or more miles lately without any chest pain or shortness of breath.  Denies any fevers or cough.  No nausea or vomiting.  No rashes.  He spoke with his primary care provider who suggested he get an ultrasound of his left lower extremity.  He has been compliant with his Xarelto. No CP at time of my evaluation.       Focused Physical Exam:  Physical Exam  Vitals and nursing note reviewed.   Constitutional:       General: He is in acute distress.      Appearance: He is well-developed. He is not ill-appearing, toxic-appearing or diaphoretic.      Comments: Elevated BMI   HENT:      Head: Normocephalic.   Eyes:      Pupils: Pupils are equal, round, and reactive to light.   Cardiovascular:      Rate and Rhythm: Normal rate and regular rhythm.      Pulses:           Radial pulses are 2+ on the right side and 2+ on the left side.      Heart sounds: Normal heart sounds.   Pulmonary:      Effort: Pulmonary effort is normal.      Breath sounds: Normal breath sounds. No wheezing, rhonchi or rales.   Chest:      Chest wall: Tenderness present.   Abdominal:      Palpations: Abdomen is soft.      Tenderness: There is no abdominal tenderness. There is no guarding or rebound.    Musculoskeletal:         General: Normal range of motion.      Cervical back: Normal range of motion and neck supple.      Left lower leg: Edema present.      Comments: Mild edema around L ankle   Skin:     General: Skin is warm and dry.      Capillary Refill: Capillary refill takes less than 2 seconds.      Findings: No rash.   Neurological:      General: No focal deficit present.      Mental Status: He is alert and oriented to person, place, and time.      Cranial Nerves: No cranial nerve deficit.      Motor: No weakness.   Psychiatric:         Mood and Affect: Mood normal.         Behavior: Behavior normal.           Assessment / Plan:        Medical Decision Making  Low suspicion for ACS based on atypical history related to chest pain and reproducible nature on exam.  His chest pain is nonexertional.  His EKG appears unchanged from prior.  Suspect left ankle swelling could be secondary to lymphedema, venous stasis, less likely DVT since the patient has been compliant with his home Xarelto but will check venous duplex ultrasound to ensure there is no worsening clot burden.  Consider CT chest if the venous duplex is concerning for worsening clot burden.  Will also check labs including troponin x 2 and chest x-ray.  Likely discharge with supportive care, close PCP follow-up, ED return precautions if workup here is normal.    Amount and/or Complexity of Data Reviewed  Independent Historian: spouse  Labs: ordered. Decision-making details documented in ED Course.  Radiology: ordered. Decision-making details documented in ED Course.  ECG/medicine tests: ordered. Decision-making details documented in ED Course.        I was physically present for the key/critical portions of the following procedures:  Procedures           Pankaj Nuñez MD  08/08/25 0543       Pankaj Nuñez MD  08/08/25 [id number]

## (undated) DEVICE — GLIDESHEATH SLENDER SS (.021) 6FR 10CM

## (undated) DEVICE — CATH GUIDING 6FR .070 XB 3.0

## (undated) DEVICE — KIT CATH LAB ANGIO

## (undated) DEVICE — CATH D 6F FR4 100CM

## (undated) DEVICE — GUIDEWIRE DIAGNOSTIC 035-260 EXCHANGE

## (undated) DEVICE — KIT ANGIOPLASTY

## (undated) DEVICE — TR BAND REGULAR

## (undated) DEVICE — CATH 6FR PIG 145 ANGLE

## (undated) DEVICE — GUIDEWIRE PRESSURE OMNI WIRE JTIP 185CM

## (undated) DEVICE — CATH 6FR FL3.5

## (undated) RX ORDER — KETOROLAC TROMETHAMINE 5 MG/ML
1 SOLUTION OPHTHALMIC
Start: 2025-01-09